# Patient Record
Sex: FEMALE | Race: WHITE | Employment: OTHER | URBAN - METROPOLITAN AREA
[De-identification: names, ages, dates, MRNs, and addresses within clinical notes are randomized per-mention and may not be internally consistent; named-entity substitution may affect disease eponyms.]

---

## 2017-12-15 ENCOUNTER — HOSPITAL ENCOUNTER (OUTPATIENT)
Dept: MAMMOGRAPHY | Age: 47
Discharge: HOME | End: 2017-12-15
Attending: OBSTETRICS & GYNECOLOGY
Payer: OTHER GOVERNMENT

## 2017-12-15 DIAGNOSIS — Z12.31 ENCOUNTER FOR SCREENING MAMMOGRAM FOR MALIGNANT NEOPLASM OF BREAST: ICD-10-CM

## 2017-12-15 PROCEDURE — 77067 SCR MAMMO BI INCL CAD: CPT

## 2019-04-24 ENCOUNTER — HOSPITAL ENCOUNTER (OUTPATIENT)
Dept: PHYSICAL THERAPY | Age: 49
Discharge: HOME | End: 2019-04-24
Payer: OTHER GOVERNMENT

## 2019-04-24 PROCEDURE — 97161 PT EVAL LOW COMPLEX 20 MIN: CPT

## 2019-04-24 NOTE — THERAPY EVALUATION
Christopher Ocasio  : 1970  Primary: [facility]  Secondary:  Therapy Center at [address]  [phone]   [phone]        OUTPATIENT PHYSICAL THERAPY:Initial Assessment 2019   ICD-10: Treatment Diagnosis: Pain in Joint, L Shoulder [M25.512]; Impingement Syndrome [M75.42]  Precautions/Allergies:   Patient has no known allergies. MD Orders: Evaluate and Treat MEDICAL/REFERRING DIAGNOSIS:  Pain in left shoulder [M25.512]   DATE OF ONSET: 2019  REFERRING PHYSICIAN: Troy Shah MD  RETURN PHYSICIAN APPOINTMENT: TBD     INITIAL ASSESSMENT:  Ms. Brice January presents with signs of L shoulder impingement syndrome. Pt. Demonstrates a painful arc and positive apple aston test today upon evaluation. Combined internal rotation, adduction, and extension during Apley testing is positive for pain and limited compared to the R side. Pt. Is tender to palpation of the L biceps musculature and long head of the biceps tendon. ROM limitations are present secondary to pain. Pt. Will benefit from a scapular and rotator cuff strengthening program to address shoulder pain and ROM limitations. PROBLEM LIST (Impacting functional limitations):  1. Decreased Strength  2. Decreased ADL/Functional Activities  3. Increased Pain  4. Decreased Activity Tolerance  5. Decreased Flexibility/Joint Mobility  6. Decreased Harvey with Home Exercise Program INTERVENTIONS PLANNED: (Treatment may consist of any combination of the following)  1. Home Exercise Program (HEP)  2. Manual Therapy  3. Range of Motion (ROM)  4. Therapeutic Activites  5. Therapeutic Exercise/Strengthening   TREATMENT PLAN:  Effective Dates: 2019 TO 2019 (90 days). Frequency/Duration: 2 times a week for 90 Day(s)  GOALS: (Goals have been discussed and agreed upon with patient.)  Discharge Goals: Time Frame: 8 weeks  1. Pt.  Will complete full strength training routing for one week with <2/10 L shoulder pain to improve recreational capacity. 2. Pt. Will sleep 8 hour without pain interruption from left shoulder to improve sleep. 3. Pt. Will demonstrate a full Apley combined movement of L shoulder IR, adduction, and extension to improve pain free ROM. 4. Pt. Will score <20% disability on the Quick DASH to demonstrate improved pain and function. OUTCOME MEASURE:   Tool Used: Disabilities of the Arm, Shoulder and Hand (DASH) Questionnaire - Quick Version  Score:  Initial: 25/55  Most Recent: X/55 (Date: -- )   Interpretation of Score: The DASH is designed to measure the activities of daily living in person's with upper extremity dysfunction or pain. Each section is scored on a 1-5 scale, 5 representing the greatest disability. The scores of each section are added together for a total score of 55. MEDICAL NECESSITY:   · Patient is expected to demonstrate progress in strength and range of motion to increase independence with ADL's and recreational capacity. .  REASON FOR SERVICES/OTHER COMMENTS:  · Patient will benefit from skilled PT to address impairments identified through evaluation and return to previous ADL and recreational capacity. Total Duration:  PT Patient Time In/Time Out  Time In: 1130  Time Out: 1230    Rehabilitation Potential For Stated Goals: Good       PAIN/SUBJECTIVE:   Initial: Pain Intensity 1: 8 /10 Post Session:  6/10   HISTORY:   History of Injury/Illness (Reason for Referral):  Pt. Attends PT c/o L shoulder pain with gradual insidious onset in March of 2019. Pt. Denies aggravating factor but notes she has been limited in usual strength training routine due to shoulder pain. Pain is aggravated with overhead activities, dressing, donning a bra, and reaching behind her.   The pain is located in the anterior/posterior shoulder with some dull ache in the front of the elbow and in the wrist.  Pt. Notes she had a cortisone injection that may have helped. Past Medical History/Comorbidities:   Ms. Dylan Quinteros  has a past medical history of Migraine. Ms. Dylan Quinteros  has a past surgical history that includes implant breast silicone/eq; hx tonsil and adenoidectomy (1995); and hx breast augmentation (2015). Social History/Living Environment:     Lives with spouse in two story home  Prior Level of Function/Work/Activity:  Functioned independently without limitations. Dominant Side:         LEFT   Ambulatory/Rehab Services H2 Model Falls Risk Assessment   Risk Factors:       No Risk Factors Identified Ability to Rise from Chair:       (0)  Ability to rise in a single movement   Falls Prevention Plan:       No modifications necessary   Total: (5 or greater = High Risk): 0   ©2010 [facility] [employer]. All Rights Reserved. [facility] Patent #[id number]. Federal Law prohibits the replication, distribution or use without written permission from [facility] Omnidrone   Current Medications:       Current Outpatient Medications:     cyclobenzaprine (FLEXERIL) 10 mg tablet, Take 1 tablet as needed once a day for tension headache, Disp: 30 Tab, Rfl: 0    norethindrone-e.estradiol-iron (LO LOESTRIN FE) 1 mg-10 mcg (24)/10 mcg (2) tab, Take 1 Tab by mouth daily. , Disp: 3 Package, Rfl: 3    SUMAtriptan (IMITREX) 100 mg tablet, TAKE 1 TABLET BY MOUTH ONCE AS NEEDED FOR MIGRAINE FOR UP TO 1 DOSE, Disp: 10 Tab, Rfl: 5    OTHER, Melaleuca Vitamin Sukhi, Disp: , Rfl:     pseudoephedrine (SUDAFED) 30 mg tablet, Take  by mouth every four (4) hours as needed for Congestion. , Disp: , Rfl:     multivitamin (ONE A DAY) tablet, Take 1 Tab by mouth daily. , Disp: , Rfl:     b complex vitamins tablet, Take 1 Tab by mouth daily. , Disp: , Rfl:     CHOLECALCIFEROL, VITAMIN D3, SL, by SubLINGual route., Disp: , Rfl:     prasterone, dhea, (DHEA) 25 mg cap, Take  by mouth., Disp: , Rfl:     Omega-3 Fatty Acids (FISH OIL) 300 mg cap, Take  by mouth., Disp: , Rfl:    Date Last Reviewed:  4/24/2019   Number of Personal Factors/Comorbidities that affect the Plan of Care: 0: LOW COMPLEXITY   EXAMINATION:    ROM:       RIGHT LEFT    Shoulder flexion  178  168    Shoulder abduction 175 170    Shoulder external rotation  100  45(pain), 95 overall    Shoulder internal rotation  50  45          Strength:       RIGHT  LEFT     Shoulder flexion  5/5 5/5     Shoulder ER  5/5  5/5    Shoulder IR 5/5 5/5    Scapular retraction  4/5  4/5          Flexibility:       RIGHT LEFT    Pectoralis Major/Minor  normal normal     Latissimus Dorsi  normal  normal    Upper trapezius  normal normal           Functional Mobility:       RIGHT LEFT    Apley IR  WNL To L5     Apley ER  WNL  WNL    Horizontal adduction        Active impingement               Special Test RIGHT LEFT    Cody Lawrence (-) (+)     Painful Arc (-)  (+)           Posture: rounded shoulders          Body Structures Involved:  1. Joints  2. Muscles Body Functions Affected:  1. Neuromusculoskeletal  2. Movement Related Activities and Participation Affected:  1. Mobility  2.  Self Care   Number of elements (examined above) that affect the Plan of Care: 1-2: LOW COMPLEXITY   CLINICAL PRESENTATION:   Presentation: Stable and uncomplicated: LOW COMPLEXITY   CLINICAL DECISION MAKING:   Use of outcome tool(s) and clinical judgement create a POC that gives a: Clear prediction of patient's progress: LOW COMPLEXITY

## 2019-04-30 ENCOUNTER — APPOINTMENT (OUTPATIENT)
Dept: PHYSICAL THERAPY | Age: 49
End: 2019-04-30
Payer: OTHER GOVERNMENT

## 2019-04-30 ENCOUNTER — HOSPITAL ENCOUNTER (OUTPATIENT)
Dept: PHYSICAL THERAPY | Age: 49
Discharge: HOME | End: 2019-04-30
Payer: OTHER GOVERNMENT

## 2019-04-30 PROCEDURE — 97140 MANUAL THERAPY 1/> REGIONS: CPT

## 2019-04-30 PROCEDURE — 97110 THERAPEUTIC EXERCISES: CPT

## 2019-04-30 NOTE — PROGRESS NOTES
eFlix Meza  : 1970  [facility]  Secondary:  [address] at [facility], [address] [facility]  [phone]   [phone]      OUTPATIENT PHYSICAL THERAPY: Daily Treatment Note 2019  Pre-treatment Symptoms/Complaints:  Pt. Reports good compliance with HEP. Pain: Initial: Pain Intensity 1: 7 /10 Post Session:  7/10   Medications Last Reviewed:  2019  Updated Objective Findings:  None Today   TREATMENT:     Therapeutic Exercise: (45 Minutes):  Exercises per grid below to improve mobility and strength. Required minimal verbal and manual cues to promote proper body alignment and promote proper body posture. Progressed resistance, range and repetitions as indicated. Date:  2019   Activity/Exercise Parameters   PNF Rhythmic stabilization 4 minutes   Sidelying shoulder ER (1#) 3 x 15   Prone scapular retraction 3 x 20   Serratus punch (4#) 3 x 15   PNF D2 shoulder extension 3 x 10   Half kneeling shoulder press (2-4#) 3 x 10   Modified push up 3 x 10   Bird dog with reach 3 x 15   Kettle bell carry 4 minutes   Rows (green band) 3 x 15     Manual Therapy (    Soft Tissue Mobilization Duration  Duration: 10 Minutes): Manual techniques to facilitate improved motion and decreased pain. (Used abbreviations: MET - muscle energy technique; PNF - proprioceptive neuromuscular facilitation; NMR - neuromuscular re-education; a/p - anterior to posterior; p/a - posterior to anterior)   · Soft tissue mobilization: L subscapularis, biceps brachii, infraspinatus  · Joint mobilization: thoracic spine grade III in prone    MedBridge Portal  Treatment/Session Summary:    · Response to Treatment:  Pt. tolerates therapeutic exercises today with mild fatigue within session. Pt. requires multiple cues for appropriate scapular positioning during exercise today.    .  · Communication/Consultation:  None today  · Equipment provided today: None today  · Recommendations/Intent for next treatment session: Next visit will focus on scapular and rotator cuff strengthening.  .  Treatment Plan of Care Effective Dates:  4/24/19 to 7/23/19  Total Treatment Billable Duration:  55 minutes  PT Patient Time In/Time Out  Time In: 1030  Time Out: [id number] Bioniz Gams, PT    Future Appointments   Date Time Provider Romi Carter   5/1/2019  7:15 AM SFE [facility] BI ROOM 2 [facility]FE   5/1/2019 10:30 AM Alana Marly, PT [facility]   5/8/2019  9:30 AM Alana Marly, PT [facility]   5/14/2019  9:30 AM Alana Marly, PT [facility]   5/15/2019 10:30 AM Alana Marly, PT [facility]   5/21/2019 10:30 AM Alana Marly, PT [facility]   9/18/2019  9:45 AM Debbie Owen MD [facility]   11/8/2019  9:00 AM PST LAB [facility] PST PST   11/15/2019  9:00 AM Ariadne Ureña MD [facility] PST PST

## 2019-05-01 ENCOUNTER — HOSPITAL ENCOUNTER (OUTPATIENT)
Dept: PHYSICAL THERAPY | Age: 49
Discharge: HOME | End: 2019-05-01
Payer: OTHER GOVERNMENT

## 2019-05-01 ENCOUNTER — HOSPITAL ENCOUNTER (OUTPATIENT)
Dept: MAMMOGRAPHY | Age: 49
Discharge: HOME | End: 2019-05-01
Attending: OBSTETRICS & GYNECOLOGY
Payer: OTHER GOVERNMENT

## 2019-05-01 ENCOUNTER — APPOINTMENT (OUTPATIENT)
Dept: PHYSICAL THERAPY | Age: 49
End: 2019-05-01
Payer: OTHER GOVERNMENT

## 2019-05-01 DIAGNOSIS — Z12.39 SCREENING FOR BREAST CANCER: ICD-10-CM

## 2019-05-01 PROCEDURE — 77067 SCR MAMMO BI INCL CAD: CPT

## 2019-05-01 PROCEDURE — 97110 THERAPEUTIC EXERCISES: CPT

## 2019-05-01 PROCEDURE — 97140 MANUAL THERAPY 1/> REGIONS: CPT

## 2019-05-01 NOTE — PROGRESS NOTES
Yasmin Pao  : 1970  Primary: [facility]  Secondary:  Therapy Center at [address], [address] [place] [address]  [phone]   [phone]      OUTPATIENT PHYSICAL THERAPY: Daily Treatment Note 2019  Pre-treatment Symptoms/Complaints:  Pt. Notes mild increase in pain over last two days. Pt. Notes the pain is only present with certain movements like reaching behind her. Pain: Initial: Pain Intensity 1: 10 Post Session:  7/10   Medications Last Reviewed:  2019  Updated Objective Findings:  Thoracic Spine hypomobile, normal ROM with lumbar lock testing in thoracic spine. TREATMENT:     Therapeutic Exercise: (45 Minutes):  Exercises per grid below to improve mobility and strength. Required minimal verbal and manual cues to promote proper body alignment and promote proper body posture. Progressed resistance, range and repetitions as indicated. Date:  2019   Activity/Exercise Parameters   PNF Rhythmic stabilization 4 minutes   Sidelying shoulder ER (1-2#) 3 x 15   Prone scapular retraction --   Serratus punch (4#) 3 x 15   PNF D2 shoulder extension --   Half kneeling shoulder press (2-4#) --   Modified push up --   Bird dog with reach 3 x 15   Kettle bell carry --   Rows (red band) 3 x 15   Rolling patterns 10 minutes   Single arm row (8#) 3 x 10   Modified plank 3 minutes   I's an T's 3 x 20     Manual Therapy (    Soft Tissue Mobilization Duration  Duration: 10 Minutes): Manual techniques to facilitate improved motion and decreased pain.  (Used abbreviations: MET - muscle energy technique; PNF - proprioceptive neuromuscular facilitation; NMR - neuromuscular re-education; a/p - anterior to posterior; p/a - posterior to anterior)   · Soft tissue mobilization: L subscapularis, biceps brachii, infraspinatus  · Joint mobilization: thoracic spine grade III in prone, Grade V in supine a/p    MedBridge Portal  Treatment/Session Summary:    · Response to Treatment:  End range external rotation at 90 degrees of abduction and combine IR,/Adduction/extesnion in the L arm reproduce pain today. Pt. demonstrates appropriate motor control with rolling patterns today after practice. Pt. will benefit from continue motor control and strengthening exercises to address pain. .  · Communication/Consultation:  None today  · Equipment provided today:  None today  · Recommendations/Intent for next treatment session: Next visit will focus on scapular and rotator cuff strengthening.  .  Treatment Plan of Care Effective Dates:  4/24/19 to 7/23/19  Total Treatment Billable Duration:  55 minutes  PT Patient Time In/Time Out  Time In: 1030  Time Out: [id number] [employer] [facility] Gams, PT    Future Appointments   Date Time Provider Romi Carter   5/8/2019  9:30 AM Gaviota Montes, PT [facility]   5/14/2019  9:30 AM Gaviota Montes, PT [facility]   5/15/2019 10:30 AM Gaviota Montes, PT [facility]   5/21/2019 10:30 AM Gaviota Montes, PT [facility]   9/18/2019  9:45 AM Chrissie Oliva MD [facility]   11/8/2019  9:00 AM PST LAB [facility] PST PST   11/15/2019  9:00 AM Danay Gutierrez MD [facility] PST PST

## 2019-05-06 ENCOUNTER — APPOINTMENT (OUTPATIENT)
Dept: PHYSICAL THERAPY | Age: 49
End: 2019-05-06
Payer: OTHER GOVERNMENT

## 2019-05-08 ENCOUNTER — APPOINTMENT (OUTPATIENT)
Dept: PHYSICAL THERAPY | Age: 49
End: 2019-05-08
Payer: OTHER GOVERNMENT

## 2019-05-08 ENCOUNTER — HOSPITAL ENCOUNTER (OUTPATIENT)
Dept: PHYSICAL THERAPY | Age: 49
Discharge: HOME | End: 2019-05-08
Payer: OTHER GOVERNMENT

## 2019-05-08 PROCEDURE — 97110 THERAPEUTIC EXERCISES: CPT

## 2019-05-08 PROCEDURE — 97140 MANUAL THERAPY 1/> REGIONS: CPT

## 2019-05-08 NOTE — PROGRESS NOTES
Fercho Sanchez  : 1970  Primary: [facility]  Secondary:  Therapy Center at [address]  [phone]   [phone]      OUTPATIENT PHYSICAL THERAPY: Daily Treatment Note 2019  Pre-treatment Symptoms/Complaints:  Pt. Notes improved L shoulder pain after resting a few days. Pt. Notes mild ache with reaching activities. Pain: Initial: Pain Intensity 1: 6 /10 Post Session:  6/10   Medications Last Reviewed:  2019  Updated Objective Findings:  None Today   TREATMENT:     Therapeutic Exercise: (40 Minutes):  Exercises per grid below to improve mobility and strength. Required minimal verbal and manual cues to promote proper body alignment and promote proper body posture. Progressed resistance, range and repetitions as indicated. Date:  2019   Activity/Exercise Parameters   PNF Rhythmic stabilization 4 minutes   Sidelying shoulder ER (1-2#) 3 x 15   Prone scapular retraction --   Serratus punch (4#) 3 x 15   PNF D2 shoulder extension 3 x 10   Half kneeling shoulder press (2-4#) --   Modified push up --   Bird dog with reach 3 x 15   CallmyName bell carry --   Rows (red band) 3 x 15   Rolling patterns --   Single arm row (10#) 3 x 10   Modified plank --   I's an T's (1#) 3 x 20   crawling 4 minutes     Manual Therapy (    Soft Tissue Mobilization Duration  Duration: 15 Minutes): Manual techniques to facilitate improved motion and decreased pain. (Used abbreviations: MET - muscle energy technique; PNF - proprioceptive neuromuscular facilitation; NMR - neuromuscular re-education; a/p - anterior to posterior; p/a - posterior to anterior)   · Soft tissue mobilization: L subscapularis, biceps brachii, infraspinatus  · Joint mobilization: thoracic spine grade III in prone, Grade V in supine a/p  · Dry needling: L subscapularis, near long head of biceps tendon, infraspinatus.     [facility] Portal  Treatment/Session Summary:    · Response to Treatment:  Pt. continues to demonstrate mild pain with end range ER. Pt. will benefit from continued rotator cuff and scapular strengthening to address continued pain. .  · Communication/Consultation:  None today  · Equipment provided today:  None today  · Recommendations/Intent for next treatment session: Next visit will focus on scapular and rotator cuff strengthening.  .  Treatment Plan of Care Effective Dates:  4/24/19 to 7/23/19  Total Treatment Billable Duration:  55 minutes  PT Patient Time In/Time Out  Time In: 0930  Time Out: 289 [facility],     Future Appointments   Date Time Provider Romi Carter   5/14/2019  9:30 AM Mariel Sinclair, PT [facility]   5/15/2019 10:30 AM Mariel Sinclair, PT [facility]   5/21/2019 10:30 AM Mariel Sinclair, PT [facility]   9/18/2019  9:45 AM Kandice James MD [facility]   11/8/2019  9:00 AM PST LAB [facility]   11/15/2019  9:00 AM Mario Hussein MD [facility] PST

## 2019-05-13 ENCOUNTER — APPOINTMENT (OUTPATIENT)
Dept: PHYSICAL THERAPY | Age: 49
End: 2019-05-13
Payer: OTHER GOVERNMENT

## 2019-05-14 ENCOUNTER — APPOINTMENT (OUTPATIENT)
Dept: PHYSICAL THERAPY | Age: 49
End: 2019-05-14
Payer: OTHER GOVERNMENT

## 2019-05-15 ENCOUNTER — HOSPITAL ENCOUNTER (OUTPATIENT)
Dept: PHYSICAL THERAPY | Age: 49
Discharge: HOME | End: 2019-05-15
Payer: OTHER GOVERNMENT

## 2019-05-15 ENCOUNTER — APPOINTMENT (OUTPATIENT)
Dept: PHYSICAL THERAPY | Age: 49
End: 2019-05-15
Payer: OTHER GOVERNMENT

## 2019-05-15 PROCEDURE — 97110 THERAPEUTIC EXERCISES: CPT

## 2019-05-15 PROCEDURE — 97140 MANUAL THERAPY 1/> REGIONS: CPT

## 2019-05-15 NOTE — PROGRESS NOTES
Stephanie Atkinson  : 1970  Primary: [facility]  Secondary:  Therapy Center at [facility], [address] [facility]  [phone]   [phone]      OUTPATIENT PHYSICAL THERAPY: Daily Treatment Note 5/15/2019   ICD-10: Treatment Diagnosis: Pain in Joint, L Shoulder [M25.512]; Impingement Syndrome [M75.42]  Precautions/Allergies:   Patient has no known allergies. MD Orders: Evaluate and Treat MEDICAL/REFERRING DIAGNOSIS:  Pain in left shoulder [M25.512]   DATE OF ONSET: 2019  REFERRING PHYSICIAN: Jessica Wilkinson MD  RETURN PHYSICIAN APPOINTMENT: TBD         Pre-treatment Symptoms/Complaints:  Pt. Reports improved L shoulder pain without major flare up since last week. Pain: Initial: Pain Intensity 1: 10 Post Session:  4/10   Medications Last Reviewed:  5/15/2019  Updated Objective Findings:  Pain reproduced at end range shoulder ER at 90 degrees of shoulder abduction   TREATMENT:     Therapeutic Exercise: (45 Minutes):  Exercises per grid below to improve mobility and strength. Required minimal verbal and manual cues to promote proper body alignment and promote proper body posture. Progressed resistance, range and repetitions as indicated. Date:  5/15/2019   Activity/Exercise Parameters   PNF Rhythmic stabilization 4 minutes   Sidelying shoulder ER (1-2#) 3 x 15   Prone scapular retraction --   Serratus punch (4#) 3 x 15   PNF D2 shoulder extension 3 x 10   Half kneeling shoulder press (4-5#) 3 x 10   Modified push up 3 x 5   Bird dog with reach --   Kettle bell carry --   Rows (red band) 3 x 15   Rolling patterns --   Single arm row (10#) 3 x 10   Modified plank 4 minutes   I's an T's (2#) 3 x 20   crawling 4 minutes     Manual Therapy (    Soft Tissue Mobilization Duration  Duration: 10 Minutes): Manual techniques to facilitate improved motion and decreased pain.  (Used abbreviations: MET - muscle energy technique; PNF - proprioceptive neuromuscular facilitation; NMR - neuromuscular re-education; a/p - anterior to posterior; p/a - posterior to anterior)   · Soft tissue mobilization: L subscapularis, biceps brachii, infraspinatus  · Joint mobilization: thoracic spine grade III in prone, Grade V in supine a/p  · Dry needling: L subscapularis, near long head of biceps tendon, infraspinatus. (NOT PERFORMED)    [facility] Portal  Treatment/Session Summary:    · Response to Treatment:  Pt. continues to require cueing for appropriate scapular and core control during exercises today. Pt. demonstrates improved endurance with strengthening exercises. .  · Communication/Consultation:  None today  · Equipment provided today:  None today  · Recommendations/Intent for next treatment session: Next visit will focus on scapular and rotator cuff strengthening.  .  Treatment Plan of Care Effective Dates:  4/24/19 to 7/23/19  Total Treatment Billable Duration:  55 minutes  PT Patient Time In/Time Out  Time In: 1030  Time Out: Romi Cotton, JAZMINE    Future Appointments   Date Time Provider Romi Carter   5/21/2019 10:30 AM Iban Gutierres PT [facility]   5/23/2019 11:30 AM Iban Gutierres PT [facility]   9/18/2019  9:45 AM Jocelynn Shen MD [facility]   11/8/2019  9:00 AM PST LAB [facility]   11/15/2019  9:00 AM Beatriz Roger MD [facility] PST

## 2019-05-20 ENCOUNTER — APPOINTMENT (OUTPATIENT)
Dept: PHYSICAL THERAPY | Age: 49
End: 2019-05-20
Payer: OTHER GOVERNMENT

## 2019-05-21 ENCOUNTER — HOSPITAL ENCOUNTER (OUTPATIENT)
Dept: PHYSICAL THERAPY | Age: 49
Discharge: HOME | End: 2019-05-21
Payer: OTHER GOVERNMENT

## 2019-05-21 PROCEDURE — 97140 MANUAL THERAPY 1/> REGIONS: CPT

## 2019-05-21 PROCEDURE — 97110 THERAPEUTIC EXERCISES: CPT

## 2019-05-21 NOTE — PROGRESS NOTES
Lennox Duck  : 1970  Primary: [facility]  Secondary:  Therapy Center at [facility], [address]  [phone]   [phone]      OUTPATIENT PHYSICAL THERAPY: Daily Treatment Note 2019   ICD-10: Treatment Diagnosis: Pain in Joint, L Shoulder [M25.512]; Impingement Syndrome [M75.42]  Precautions/Allergies:   Patient has no known allergies. MD Orders: Evaluate and Treat MEDICAL/REFERRING DIAGNOSIS:  Pain in left shoulder [M25.512]   DATE OF ONSET: 2019  REFERRING PHYSICIAN: Kieran Ochoa MD  RETURN PHYSICIAN APPOINTMENT: TBD         Pre-treatment Symptoms/Complaints:  Pt. Notes minimal pain and good compliance with HEP. Pain: Initial: Pain Intensity 1: 3 /10 Post Session:  3/10   Medications Last Reviewed:  2019  Updated Objective Findings:  None Today   TREATMENT:     Therapeutic Exercise: (45 Minutes):  Exercises per grid below to improve mobility and strength. Required minimal verbal and manual cues to promote proper body alignment and promote proper body posture. Progressed resistance, range and repetitions as indicated. Date:  2019   Activity/Exercise Parameters   PNF Rhythmic stabilization 4 minutes   Sidelying shoulder ER (1-2#) 3 x 15   Prone scapular retraction --   Serratus punch (4#) --   PNF D2 shoulder extension (10#) 3 x 10   Half kneeling shoulder press (5-10#) 3 x 10   Modified push up 3 x 5   Bird dog with resistance band 3 x 20   Kettle bell carry 4 minutes   Rows (red band) --   Rolling patterns --   Single arm row (10#) 3 x 10   Modified plank 4 minutes   I's an T's (2#) --   crawling 4 minutes     Manual Therapy (    Soft Tissue Mobilization Duration  Duration: 10 Minutes): Manual techniques to facilitate improved motion and decreased pain.  (Used abbreviations: MET - muscle energy technique; PNF - proprioceptive neuromuscular facilitation; NMR - neuromuscular re-education; a/p - anterior to posterior; p/a - posterior to anterior)   · Soft tissue mobilization: L subscapularis, biceps brachii, infraspinatus  · Joint mobilization: thoracic spine grade III in prone, Grade V in supine a/p  · Dry needling: L subscapularis, near long head of biceps tendon, infraspinatus. (NOT PERFORMED)    MedBridge Portal  Treatment/Session Summary:    · Response to Treatment:  Pt. demonstrates improved performance with scapular and rotator cuff strengthening. Mild pain remains at end range ER at 90 degrees of abdcution. Pt. will work on HEP and f/u with PT in two weeks. .  · Communication/Consultation:  None today  · Equipment provided today:  None today  · Recommendations/Intent for next treatment session: Next visit will focus on scapular and rotator cuff strengthening. PT will consider d/c with HEP.    Treatment Plan of Care Effective Dates:  4/24/19 to 7/23/19  Total Treatment Billable Duration:  55 minutes  PT Patient Time In/Time Out  Time In: 1030  Time Out: Romi Cotton PT    Future Appointments   Date Time Provider Romi Carter   6/6/2019 10:30 AM Sourav Glass PT [facility]   9/18/2019  9:45 AM Hailee Cantu MD [facility]   11/8/2019  9:00 AM PST LAB [facility]   11/15/2019  9:00 AM Faviola Arambula MD [facility]

## 2019-05-23 ENCOUNTER — APPOINTMENT (OUTPATIENT)
Dept: PHYSICAL THERAPY | Age: 49
End: 2019-05-23
Payer: OTHER GOVERNMENT

## 2019-05-29 NOTE — PROGRESS NOTES
I am accessing Ms. Stark's chart as a part of our department's internal chart auditing process. I certify that Ms. Charlotte Zacarias is, or was, a patient in our department.   Thank you,  Burton Jimenez, PT  5/29/2019

## 2019-06-06 ENCOUNTER — HOSPITAL ENCOUNTER (OUTPATIENT)
Dept: PHYSICAL THERAPY | Age: 49
Discharge: HOME | End: 2019-06-06
Payer: OTHER GOVERNMENT

## 2019-06-06 PROCEDURE — 97110 THERAPEUTIC EXERCISES: CPT

## 2019-06-06 NOTE — PROGRESS NOTES
Benigno Dakins  : 1970  Primary: [facility]  Secondary:  [facility] at [facility], [address] [facility]  [phone]   [phone]      OUTPATIENT PHYSICAL THERAPY: Daily Treatment Note 2019   ICD-10: Treatment Diagnosis: Pain in Joint, L Shoulder [M25.512]; Impingement Syndrome [M75.42]  Precautions/Allergies:   Patient has no known allergies. MD Orders: Evaluate and Treat MEDICAL/REFERRING DIAGNOSIS:  Pain in left shoulder [M25.512]   DATE OF ONSET: 2019  REFERRING PHYSICIAN: Jessica Lujan MD  RETURN PHYSICIAN APPOINTMENT: TBD         Pre-treatment Symptoms/Complaints:  Pt. Reports mostly 0/10 L shoulder pain over the past three weeks with home program.  Pt. Did experience a flare up on Monday with weight training. The pain resolved in 2 days. Pt. Notes she is confident to discharge with HEP. Pain: Initial: Pain Intensity 1: 1 /10 Post Session:  1/10   Medications Last Reviewed:  2019  Updated Objective Findings:  See evaluation note from today   TREATMENT:     Therapeutic Exercise: (45 Minutes):  Exercises per grid below to improve mobility and strength. Required minimal verbal and manual cues to promote proper body alignment and promote proper body posture. Progressed resistance, range and repetitions as indicated.      Date:  2019   Activity/Exercise Parameters   PNF Rhythmic stabilization 4 minutes   Sidelying shoulder ER (1-2#) --   Prone scapular retraction --   Serratus punch (4#) --   PNF D2 shoulder extension (10#) 3 x 10   Half kneeling shoulder press (5-10#) 3 x 10   Modified push up 3 x 5   Bird dog with resistance band 3 x 20   Kettle bell carry 4 minutes   Rows (red band) --   Rolling patterns --   Single arm row (10#) 3 x 10   Modified plank 4 minutes   I's an T's (2#) 3 x 10   Side plank 2 minutes       Keepy  Treatment/Session Summary:    · Response to Treatment:  Pt. demonstrates appropriate psychomotor knowledge of HEP and will discharge at this time.  .  · Communication/Consultation:  None today  · Equipment provided today:  None today  Treatment Plan of Care Effective Dates:  4/24/19 to 7/23/19  Total Treatment Billable Duration:  45 minutes  PT Patient Time In/Time Out  Time In: 1030  Time Out: Romi Cotton PT    Future Appointments   Date Time Provider Romi Carter   9/18/2019  9:45 AM Becca Felton MD [facility]   11/8/2019  9:00 AM PST LAB [facility]   11/15/2019  9:00 AM Davis Bishop MD [facility] PST

## 2019-06-06 NOTE — THERAPY DISCHARGE
Gregory Salvador  : 1970  [facility]  Secondary:  [facility] at [address]  [phone]   [phone]        OUTPATIENT PHYSICAL THERAPY:Discharge Summary 2019   ICD-10: Treatment Diagnosis: Pain in Joint, L Shoulder [M25.512]; Impingement Syndrome [M75.42]  Precautions/Allergies:   Patient has no known allergies. MD Orders: Evaluate and Treat MEDICAL/REFERRING DIAGNOSIS:  Pain in left shoulder [M25.512]   DATE OF ONSET: 2019  REFERRING PHYSICIAN: Gibson Gallo MD  RETURN PHYSICIAN APPOINTMENT: TBD     INITIAL ASSESSMENT:  Ms. Maribell Lai presents with signs of L shoulder impingement syndrome. Pt. Demonstrates a painful arc and positive apple aston test today upon evaluation. Combined internal rotation, adduction, and extension during Apley testing is positive for pain and limited compared to the R side. Pt. Is tender to palpation of the L biceps musculature and long head of the biceps tendon. ROM limitations are present secondary to pain. Pt. Will benefit from a scapular and rotator cuff strengthening program to address shoulder pain and ROM limitations. Discharge Summary: Pt. Attends 7 physical therapy visits. Pt. Demonstrates significant improvements in pain and function as assessed with the Quick DASH outcome measure. Pain free L shoulder ROM improves since initial evaluation and patient is independent with HEP to address scapular and rotator cuff strength. Pt will be discharged at this time. PROBLEM LIST (Impacting functional limitations):  1. Decreased Strength  2. Decreased ADL/Functional Activities  3. Increased Pain  4. Decreased Activity Tolerance  5. Decreased Flexibility/Joint Mobility  6. Decreased Harmon with Home Exercise Program INTERVENTIONS PLANNED: (Treatment may consist of any combination of the following)  1. Home Exercise Program (HEP)  2.  Manual Therapy  3. Range of Motion (ROM)  4. Therapeutic Activites  5. Therapeutic Exercise/Strengthening   TREATMENT PLAN:  Effective Dates: 4/24/2019 TO 7/23/2019 (90 days). Frequency/Duration: 2 times a week for 90 Day(s)  GOALS: (Goals have been discussed and agreed upon with patient.)  Discharge Goals: Time Frame: 8 weeks  1. Pt. Will complete full strength training routing for one week with <2/10 L shoulder pain to improve recreational capacity. NOT MET  2. Pt. Will sleep 8 hour without pain interruption from left shoulder to improve sleep. MET  3. Pt. Will demonstrate a full Apley combined movement of L shoulder IR, adduction, and extension to improve pain free ROM. MET  4. Pt. Will score <20% disability on the Quick DASH to demonstrate improved pain and function. MET    OUTCOME MEASURE:   Tool Used: Disabilities of the Arm, Shoulder and Hand (DASH) Questionnaire - Quick Version  Score:  Initial: 25/55  Most Recent: 12/55 (Date: 6/6/19 )   Interpretation of Score: The DASH is designed to measure the activities of daily living in person's with upper extremity dysfunction or pain. Each section is scored on a 1-5 scale, 5 representing the greatest disability. The scores of each section are added together for a total score of 55.        Total Duration:  PT Patient Time In/Time Out  Time In: 1030  Time Out: 1130    Rehabilitation Potential For Stated Goals: Aziza Kimble PT, DPT

## 2019-09-30 RX ORDER — SODIUM CHLORIDE 0.9 % (FLUSH) 0.9 %
5-40 SYRINGE (ML) INJECTION AS NEEDED
Status: CANCELLED | OUTPATIENT
Start: 2019-09-30

## 2019-09-30 RX ORDER — SODIUM CHLORIDE 0.9 % (FLUSH) 0.9 %
5-40 SYRINGE (ML) INJECTION EVERY 8 HOURS
Status: CANCELLED | OUTPATIENT
Start: 2019-09-30

## 2019-10-04 ENCOUNTER — ANESTHESIA EVENT (OUTPATIENT)
Dept: SURGERY | Age: 49
End: 2019-10-04
Payer: OTHER GOVERNMENT

## 2019-10-04 RX ORDER — ACETAMINOPHEN 500 MG
500 TABLET ORAL ONCE
Status: CANCELLED | OUTPATIENT
Start: 2019-10-04 | End: 2019-10-04

## 2019-10-04 RX ORDER — ONDANSETRON 2 MG/ML
4 INJECTION INTRAMUSCULAR; INTRAVENOUS ONCE
Status: CANCELLED | OUTPATIENT
Start: 2019-10-04 | End: 2019-10-04

## 2019-10-04 RX ORDER — OXYCODONE HYDROCHLORIDE 5 MG/1
10 TABLET ORAL
Status: CANCELLED | OUTPATIENT
Start: 2019-10-04 | End: 2019-10-05

## 2019-10-04 RX ORDER — NALOXONE HYDROCHLORIDE 0.4 MG/ML
0.1 INJECTION, SOLUTION INTRAMUSCULAR; INTRAVENOUS; SUBCUTANEOUS AS NEEDED
Status: CANCELLED | OUTPATIENT
Start: 2019-10-04 | End: 2019-10-04

## 2019-10-04 RX ORDER — SODIUM CHLORIDE, SODIUM LACTATE, POTASSIUM CHLORIDE, CALCIUM CHLORIDE 600; 310; 30; 20 MG/100ML; MG/100ML; MG/100ML; MG/100ML
75 INJECTION, SOLUTION INTRAVENOUS CONTINUOUS
Status: CANCELLED | OUTPATIENT
Start: 2019-10-04

## 2019-10-04 RX ORDER — HYDROMORPHONE HYDROCHLORIDE 2 MG/ML
0.5 INJECTION, SOLUTION INTRAMUSCULAR; INTRAVENOUS; SUBCUTANEOUS
Status: CANCELLED | OUTPATIENT
Start: 2019-10-04

## 2019-10-04 RX ORDER — OXYCODONE HYDROCHLORIDE 5 MG/1
5 TABLET ORAL
Status: CANCELLED | OUTPATIENT
Start: 2019-10-04 | End: 2019-10-05

## 2019-10-04 RX ORDER — DIPHENHYDRAMINE HYDROCHLORIDE 50 MG/ML
12.5 INJECTION, SOLUTION INTRAMUSCULAR; INTRAVENOUS ONCE
Status: CANCELLED | OUTPATIENT
Start: 2019-10-04 | End: 2019-10-04

## 2019-10-07 ENCOUNTER — HOSPITAL ENCOUNTER (OUTPATIENT)
Age: 49
Setting detail: OUTPATIENT SURGERY
Discharge: HOME | End: 2019-10-07
Attending: ORTHOPAEDIC SURGERY | Admitting: ORTHOPAEDIC SURGERY
Payer: OTHER GOVERNMENT

## 2019-10-07 ENCOUNTER — ANESTHESIA (OUTPATIENT)
Dept: SURGERY | Age: 49
End: 2019-10-07
Payer: OTHER GOVERNMENT

## 2019-10-07 VITALS
HEART RATE: 85 BPM | WEIGHT: 155 LBS | SYSTOLIC BLOOD PRESSURE: 117 MMHG | OXYGEN SATURATION: 96 % | BODY MASS INDEX: 24.28 KG/M2 | TEMPERATURE: 98 F | RESPIRATION RATE: 16 BRPM | DIASTOLIC BLOOD PRESSURE: 71 MMHG

## 2019-10-07 PROCEDURE — 77030010509 HC AIRWY LMA MSK TELE -A: Performed by: ANESTHESIOLOGY

## 2019-10-07 PROCEDURE — 74011250636 HC RX REV CODE- 250/636: Performed by: ORTHOPAEDIC SURGERY

## 2019-10-07 PROCEDURE — 77030032490 HC SLV COMPR SCD KNE COVD -B: Performed by: ORTHOPAEDIC SURGERY

## 2019-10-07 PROCEDURE — 74011250636 HC RX REV CODE- 250/636: Performed by: ANESTHESIOLOGY

## 2019-10-07 PROCEDURE — 74011000250 HC RX REV CODE- 250

## 2019-10-07 PROCEDURE — 77030019605: Performed by: ORTHOPAEDIC SURGERY

## 2019-10-07 PROCEDURE — 77030027384 HC PRB ARTHSCP SERFAS STRY -C: Performed by: ORTHOPAEDIC SURGERY

## 2019-10-07 PROCEDURE — 76210000020 HC REC RM PH II FIRST 0.5 HR: Performed by: ORTHOPAEDIC SURGERY

## 2019-10-07 PROCEDURE — 77030003666 HC NDL SPINAL BD -A: Performed by: ORTHOPAEDIC SURGERY

## 2019-10-07 PROCEDURE — 76210000063 HC OR PH I REC FIRST 0.5 HR: Performed by: ORTHOPAEDIC SURGERY

## 2019-10-07 PROCEDURE — 77030002933 HC SUT MCRYL J&J -A: Performed by: ORTHOPAEDIC SURGERY

## 2019-10-07 PROCEDURE — 77030004453 HC BUR SHV STRY -B: Performed by: ORTHOPAEDIC SURGERY

## 2019-10-07 PROCEDURE — 77030006891 HC BLD SHV RESECT STRY -B: Performed by: ORTHOPAEDIC SURGERY

## 2019-10-07 PROCEDURE — 76060000033 HC ANESTHESIA 1 TO 1.5 HR: Performed by: ORTHOPAEDIC SURGERY

## 2019-10-07 PROCEDURE — 77030003602 HC NDL NRV BLK BBMI -B: Performed by: ANESTHESIOLOGY

## 2019-10-07 PROCEDURE — 77030018836 HC SOL IRR NACL ICUM -A: Performed by: ORTHOPAEDIC SURGERY

## 2019-10-07 PROCEDURE — A4565 SLINGS: HCPCS | Performed by: ORTHOPAEDIC SURGERY

## 2019-10-07 PROCEDURE — 76942 ECHO GUIDE FOR BIOPSY: CPT | Performed by: ORTHOPAEDIC SURGERY

## 2019-10-07 PROCEDURE — 77030016570 HC BLNKT BAIR HGGR 3M -B: Performed by: ANESTHESIOLOGY

## 2019-10-07 PROCEDURE — 77030040361 HC SLV COMPR DVT MDII -B: Performed by: ORTHOPAEDIC SURGERY

## 2019-10-07 PROCEDURE — 74011250636 HC RX REV CODE- 250/636

## 2019-10-07 PROCEDURE — 76010000161 HC OR TIME 1 TO 1.5 HR INTENSV-TIER 1: Performed by: ORTHOPAEDIC SURGERY

## 2019-10-07 PROCEDURE — 76010010054 HC POST OP PAIN BLOCK: Performed by: ORTHOPAEDIC SURGERY

## 2019-10-07 RX ORDER — LIDOCAINE HYDROCHLORIDE 20 MG/ML
INJECTION, SOLUTION EPIDURAL; INFILTRATION; INTRACAUDAL; PERINEURAL AS NEEDED
Status: DISCONTINUED | OUTPATIENT
Start: 2019-10-07 | End: 2019-10-07 | Stop reason: HOSPADM

## 2019-10-07 RX ORDER — EPINEPHRINE 1 MG/ML
INJECTION INTRAMUSCULAR; INTRAVENOUS; SUBCUTANEOUS AS NEEDED
Status: DISCONTINUED | OUTPATIENT
Start: 2019-10-07 | End: 2019-10-07 | Stop reason: HOSPADM

## 2019-10-07 RX ORDER — CEFAZOLIN SODIUM/WATER 2 G/20 ML
2 SYRINGE (ML) INTRAVENOUS ONCE
Status: COMPLETED | OUTPATIENT
Start: 2019-10-07 | End: 2019-10-07

## 2019-10-07 RX ORDER — SODIUM CHLORIDE, SODIUM LACTATE, POTASSIUM CHLORIDE, CALCIUM CHLORIDE 600; 310; 30; 20 MG/100ML; MG/100ML; MG/100ML; MG/100ML
1000 INJECTION, SOLUTION INTRAVENOUS CONTINUOUS
Status: DISCONTINUED | OUTPATIENT
Start: 2019-10-07 | End: 2019-10-07 | Stop reason: HOSPADM

## 2019-10-07 RX ORDER — LIDOCAINE HYDROCHLORIDE 10 MG/ML
0.1 INJECTION INFILTRATION; PERINEURAL AS NEEDED
Status: DISCONTINUED | OUTPATIENT
Start: 2019-10-07 | End: 2019-10-07 | Stop reason: HOSPADM

## 2019-10-07 RX ORDER — SODIUM CHLORIDE 0.9 % (FLUSH) 0.9 %
5-40 SYRINGE (ML) INJECTION EVERY 8 HOURS
Status: DISCONTINUED | OUTPATIENT
Start: 2019-10-07 | End: 2019-10-07 | Stop reason: HOSPADM

## 2019-10-07 RX ORDER — ROPIVACAINE HYDROCHLORIDE 5 MG/ML
INJECTION, SOLUTION EPIDURAL; INFILTRATION; PERINEURAL
Status: COMPLETED | OUTPATIENT
Start: 2019-10-07 | End: 2019-10-07

## 2019-10-07 RX ORDER — PROPOFOL 10 MG/ML
INJECTION, EMULSION INTRAVENOUS AS NEEDED
Status: DISCONTINUED | OUTPATIENT
Start: 2019-10-07 | End: 2019-10-07 | Stop reason: HOSPADM

## 2019-10-07 RX ORDER — SODIUM CHLORIDE 0.9 % (FLUSH) 0.9 %
5-40 SYRINGE (ML) INJECTION AS NEEDED
Status: DISCONTINUED | OUTPATIENT
Start: 2019-10-07 | End: 2019-10-07 | Stop reason: HOSPADM

## 2019-10-07 RX ORDER — MIDAZOLAM HYDROCHLORIDE 1 MG/ML
2 INJECTION, SOLUTION INTRAMUSCULAR; INTRAVENOUS
Status: COMPLETED | OUTPATIENT
Start: 2019-10-07 | End: 2019-10-07

## 2019-10-07 RX ORDER — ONDANSETRON 2 MG/ML
INJECTION INTRAMUSCULAR; INTRAVENOUS AS NEEDED
Status: DISCONTINUED | OUTPATIENT
Start: 2019-10-07 | End: 2019-10-07 | Stop reason: HOSPADM

## 2019-10-07 RX ORDER — DEXAMETHASONE SODIUM PHOSPHATE 4 MG/ML
INJECTION, SOLUTION INTRA-ARTICULAR; INTRALESIONAL; INTRAMUSCULAR; INTRAVENOUS; SOFT TISSUE AS NEEDED
Status: DISCONTINUED | OUTPATIENT
Start: 2019-10-07 | End: 2019-10-07 | Stop reason: HOSPADM

## 2019-10-07 RX ORDER — FENTANYL CITRATE 50 UG/ML
100 INJECTION, SOLUTION INTRAMUSCULAR; INTRAVENOUS AS NEEDED
Status: DISCONTINUED | OUTPATIENT
Start: 2019-10-07 | End: 2019-10-07 | Stop reason: HOSPADM

## 2019-10-07 RX ADMIN — FENTANYL CITRATE 100 MCG: 50 INJECTION INTRAMUSCULAR; INTRAVENOUS at 06:47

## 2019-10-07 RX ADMIN — ONDANSETRON 4 MG: 2 INJECTION INTRAMUSCULAR; INTRAVENOUS at 07:43

## 2019-10-07 RX ADMIN — PROPOFOL 200 MG: 10 INJECTION, EMULSION INTRAVENOUS at 07:19

## 2019-10-07 RX ADMIN — MIDAZOLAM 2 MG: 1 INJECTION INTRAMUSCULAR; INTRAVENOUS at 06:47

## 2019-10-07 RX ADMIN — ROPIVACAINE HYDROCHLORIDE 30 ML: 5 INJECTION, SOLUTION EPIDURAL; INFILTRATION; PERINEURAL at 06:51

## 2019-10-07 RX ADMIN — LIDOCAINE HYDROCHLORIDE 100 MG: 20 INJECTION, SOLUTION EPIDURAL; INFILTRATION; INTRACAUDAL; PERINEURAL at 07:19

## 2019-10-07 RX ADMIN — SODIUM CHLORIDE, SODIUM LACTATE, POTASSIUM CHLORIDE, AND CALCIUM CHLORIDE: 600; 310; 30; 20 INJECTION, SOLUTION INTRAVENOUS at 07:13

## 2019-10-07 RX ADMIN — Medication 2 G: at 07:30

## 2019-10-07 RX ADMIN — DEXAMETHASONE SODIUM PHOSPHATE 4 MG: 4 INJECTION, SOLUTION INTRA-ARTICULAR; INTRALESIONAL; INTRAMUSCULAR; INTRAVENOUS; SOFT TISSUE at 07:43

## 2019-10-07 RX ADMIN — SODIUM CHLORIDE, SODIUM LACTATE, POTASSIUM CHLORIDE, AND CALCIUM CHLORIDE 1000 ML: 600; 310; 30; 20 INJECTION, SOLUTION INTRAVENOUS at 06:53

## 2019-10-07 NOTE — OP NOTES
[address] [facility]  OPERATIVE REPORT    Name:  Tere Mosqueda  MR#:  [id number]  :  1970  ACCOUNT #:  [de-identified]  DATE OF SERVICE:  10/07/2019    PREOPERATIVE DIAGNOSES:  Impingement of the left shoulder with acromioclavicular joint arthritis and possible rotator cuff tear. POSTOPERATIVE DIAGNOSES:  1. Acromioclavicular joint arthritis of the left shoulder. 2.  Small bursal tear of the rotator cuff. 3.  Marked impingement of the left shoulder. PROCEDURE PERFORMED:  1. Arthroscopic resection of the distal clavicle. 62557  2. Debridement of small bursal tear of the rotator cuff - 87161.  3.  Subacromial decompression - . SURGEON:  Mariel Bond MD    ASSISTANT:  None. ANESTHESIA:  General.    COMPLICATIONS:  None. SPECIMENS REMOVED:  None. IMPLANTS:  None. ESTIMATED BLOOD LOSS:  Minimal.    PROCEDURE:  After an adequate level of general anesthesia was obtained, the patient's left shoulder was prepped and draped in usual sterile fashion. She had good motion of the shoulder, good stability. She had a block per Anesthesia for postop pain management and received antibiotics. The joint was distended posteriorly with saline. The arthroscope was introduced. The articular surface looked good. Undersurface of the rotator cuff looked fine. The biceps was intact and the anterior portal was made in the soft spot and probed and stable. There was no evidence of any instability. Arthroscope was placed in the subacromial space and a lateral portal was made. The patient had hooking of the acromion and a decompression was performed with the shaver and the wilfrido. She had osteophytes anteriorly and laterally. She had a thickened bursa. A bursectomy was performed. The rotator cuff was visualized and she had some bursal tearing of the rotator cuff but no full-thickness lesions or enough instability or loss of integrity to warrant a repair. This area was debrided with the shaver. She had degenerative changes noted on the end of the distal clavicle. Circumferentially, soft tissue was removed and then the inferior osteophytes removed from the lateral portal to 1 cm with a wilfrido in the undersurface and then to the more anterior portal and also the posterior portal with pressure applied superiorly. The superior bone was removed from both sides of the joint with resection of the distal clavicle. The wound was then copiously irrigated. The portals were closed with Steri-Strips. Sterile dressings were applied. The arm was placed in a sling. She tolerated the procedure well.       MD KAYDEN [email or web address]  D:  10/07/2019 8:11  T:  10/07/2019 8:32  JOB #:  [id number]  CC:  [facility]

## 2019-10-07 NOTE — ANESTHESIA PROCEDURE NOTES
Peripheral Block    Start time: 10/7/2019 6:47 AM  End time: 10/7/2019 6:50 AM  Performed by: Devonte Calvert MD  Authorized by: Devonte Calvert MD       Pre-procedure: Indications: at surgeon's request, post-op pain management and procedure for pain    Preanesthetic Checklist: patient identified, risks and benefits discussed, site marked, timeout performed, anesthesia consent given and patient being monitored    Timeout Time: 06:47          Block Type:   Block Type:   Interscalene  Laterality:  Left  Monitoring:  Standard ASA monitoring, responsive to questions, oxygen, continuous pulse ox, frequent vital sign checks and heart rate  Injection Technique:  Single shot  Procedures: ultrasound guided and nerve stimulator    Patient Position: seated  Prep: chlorhexidine    Location:  Interscalene  Needle Type:  Stimuplex  Needle Gauge:  22 G  Needle Localization:  Ultrasound guidance and nerve stimulator  Motor Response: minimal motor response >0.4 mA      Assessment:  Number of attempts:  1  Injection Assessment:  Incremental injection every 5 mL, no paresthesia, negative aspiration for CSF, local visualized surrounding nerve on ultrasound, negative aspiration for blood, no intravascular symptoms and ultrasound image on chart  Patient tolerance:  Patient tolerated the procedure well with no immediate complications

## 2019-10-07 NOTE — ANESTHESIA POSTPROCEDURE EVALUATION
Procedure(s):  LEFT SHOULDER - ARTHROSCOPY /DISTAL CLAVICLE RESECTION/ DECOMPRESSION . general, regional    Anesthesia Post Evaluation      Multimodal analgesia: multimodal analgesia used between 6 hours prior to anesthesia start to PACU discharge  Patient location during evaluation: bedside  Patient participation: complete - patient participated  Level of consciousness: responsive to verbal stimuli  Pain management: adequate  Airway patency: patent  Anesthetic complications: no  Cardiovascular status: hemodynamically stable  Respiratory status: spontaneous ventilation  Hydration status: stable        Vitals Value Taken Time   /68 10/7/2019  8:32 AM   Temp 36.8 °C (98.3 °F) 10/7/2019  8:25 AM   Pulse 77 10/7/2019  8:35 AM   Resp 16 10/7/2019  8:25 AM   SpO2 96 % 10/7/2019  8:35 AM   Vitals shown include unvalidated device data.

## 2019-10-07 NOTE — BRIEF OP NOTE
BRIEF OPERATIVE NOTE Date of Procedure: 10/7/2019 Preoperative Diagnosis: DJD of left AC (acromioclavicular) joint [M19.012] Incomplete rotator cuff tear or rupture of left shoulder, not specified as traumatic [M75.112] Postoperative Diagnosis: DJD of left AC (acromioclavicular) joint [M19.012] IMPINGEMENT Procedure(s): LEFT SHOULDER - ARTHROSCOPY /DISTAL CLAVICLE RESECTION/ DECOMPRESSION Surgeon(s) and Role: Gage Lee MD - Primary Surgical Assistant:  
 
 
Surgical Staff: 
Circ-1: Aram Thomas RN Scrub Tech-1: Neftali Reza Scrub Tech-2: Pepe Douglas Time In Time Out Incision Start 2853 Incision Close 0806 Anesthesia: General  
Estimated Blood Loss:  
 
Specimens: * No specimens in log * Findings:  
  
Complications:  
 
 
Implants: * No implants in log *

## 2019-10-07 NOTE — H&P
Outpatient Surgery History and Physical:  Kamran Smith was seen and examined. CHIEF COMPLAINT:     l knee . PE:     Visit Vitals  BP 99/62   Pulse 64   Temp 98.4 °F (36.9 °C)   Resp 18   Wt 70.3 kg (155 lb)   SpO2 98%   BMI 24.28 kg/m²       Heart:   Regular rhythm      Lungs:  Are clear      Past Medical History: There are no active problems to display for this patient. Surgical History:   Past Surgical History:   Procedure Laterality Date    HX ADENOIDECTOMY      HX BREAST AUGMENTATION  2015    HX TONSIL AND ADENOIDECTOMY  1995    HX TONSILLECTOMY      IMPLANT BREAST SILICONE/EQ         Social History: Patient  reports that she has never smoked. She has never used smokeless tobacco. She reports that she does not drink alcohol or use drugs. Family History:   Family History   Problem Relation Age of Onset    Breast Cancer Mother 58    Hypertension Mother     Depression Mother     Anxiety Mother     Alcohol abuse Father     Hypertension Father     Depression Father     Anxiety Father     Cancer Father         stage 4 lung cancer    Heart Disease Maternal Grandmother     Stroke Maternal Grandfather     Uterine Cancer Paternal Grandmother     Alcohol abuse Paternal Grandfather     Heart Attack Paternal Grandfather     No Known Problems Brother     No Known Problems Brother     Ovarian Cancer Neg Hx     Colon Cancer Neg Hx        Allergies: Reviewed per EMR  No Known Allergies    Medications:    No current facility-administered medications on file prior to encounter. Current Outpatient Medications on File Prior to Encounter   Medication Sig    norethindrone-e.estradiol-iron (LO LOESTRIN FE) 1 mg-10 mcg (24)/10 mcg (2) tab Take 1 Tab by mouth daily.     cyclobenzaprine (FLEXERIL) 10 mg tablet Take 1 tablet as needed once a day for tension headache    SUMAtriptan (IMITREX) 100 mg tablet TAKE 1 TABLET BY MOUTH ONCE AS NEEDED FOR MIGRAINE FOR UP TO 1 DOSE    OTHER Melaleuca Vitamin Sukhi    pseudoephedrine (SUDAFED) 30 mg tablet Take  by mouth every four (4) hours as needed for Congestion.  multivitamin (ONE A DAY) tablet Take 1 Tab by mouth daily.  b complex vitamins tablet Take 1 Tab by mouth daily.  CHOLECALCIFEROL, VITAMIN D3, SL by SubLINGual route.  prasterone, dhea, (DHEA) 25 mg cap Take  by mouth.  Omega-3 Fatty Acids (FISH OIL) 300 mg cap Take  by mouth. The surgery is planned for the  Knee . History and physical has been reviewed. The patient has been examined. There have been no significant clinical changes since the completion of the originally dated History and Physical.  Patient identified by surgeon; surgical site was confirmed by patient and surgeon. The patient is here today for outpatient surgery. I have examined the patient, no changes are noted in the patient's medical status. Necessity for the procedure/care is still present and the history and physical above is current. See the office notes for the full long term history of the problem. Please see the recent office notes for the musculoskeletal examination.     Signed By: Tsering Nair MD     October 7, 2019 7:04 AM

## 2019-10-07 NOTE — ANESTHESIA PREPROCEDURE EVALUATION
Relevant Problems   No relevant active problems       Anesthetic History   No history of anesthetic complications            Review of Systems / Medical History  Patient summary reviewed and pertinent labs reviewed    Pulmonary  Within defined limits                 Neuro/Psych         Headaches     Cardiovascular  Within defined limits                Exercise tolerance: >4 METS     GI/Hepatic/Renal  Within defined limits              Endo/Other  Within defined limits           Other Findings              Physical Exam    Airway  Mallampati: II  TM Distance: 4 - 6 cm         Cardiovascular    Rhythm: regular  Rate: normal         Dental  No notable dental hx       Pulmonary  Breath sounds clear to auscultation               Abdominal  GI exam deferred       Other Findings            Anesthetic Plan    ASA: 1  Anesthesia type: general      Post-op pain plan if not by surgeon: peripheral nerve block single    Induction: Intravenous  Anesthetic plan and risks discussed with: Patient and Family

## 2019-10-07 NOTE — DISCHARGE INSTRUCTIONS
ACTIVITY  · As tolerated and as directed by your doctor. · Bathe or shower as directed by your doctor. DIET  · Clear liquids until no nausea or vomiting; then light diet for the first day. · Advance to regular diet on second day, unless your doctor orders otherwise. · If nausea and vomiting continues, call your doctor. PAIN  · Take pain medication as directed by your doctor. · Call your doctor if pain is NOT relieved by medication. · DO NOT take aspirin of blood thinners unless directed by your doctor. DRESSING CARE       CALL YOUR DOCTOR IF   · Excessive bleeding that does not stop after holding pressure over the area  · Temperature of 101 degrees F or above  · Excessive redness, swelling or bruising, and/ or green or yellow, smelly discharge from incision    AFTER ANESTHESIA   · For the first 24 hours: DO NOT Drive, Drink alcoholic beverages, or Make important decisions. · Be aware of dizziness following anesthesia and while taking pain medication. APPOINTMENT DATE/ TIME    YOUR DOCTOR'S PHONE NUMBER       DISCHARGE SUMMARY from Nurse    PATIENT INSTRUCTIONS:    After general anesthesia or intravenous sedation, for 24 hours or while taking prescription Narcotics:  · Limit your activities  · Do not drive and operate hazardous machinery  · Do not make important personal or business decisions  · Do  not drink alcoholic beverages  · If you have not urinated within 8 hours after discharge, please contact your surgeon on call. *  Please give a list of your current medications to your Primary Care Provider. *  Please update this list whenever your medications are discontinued, doses are      changed, or new medications (including over-the-counter products) are added. *  Please carry medication information at all times in case of emergency situations.       These are general instructions for a healthy lifestyle:    No smoking/ No tobacco products/ Avoid exposure to second hand smoke    Surgeon General's Warning:  Quitting smoking now greatly reduces serious risk to your health. Obesity, smoking, and sedentary lifestyle greatly increases your risk for illness    A healthy diet, regular physical exercise & weight monitoring are important for maintaining a healthy lifestyle    You may be retaining fluid if you have a history of heart failure or if you experience any of the following symptoms:  Weight gain of 3 pounds or more overnight or 5 pounds in a week, increased swelling in our hands or feet or shortness of breath while lying flat in bed. Please call your doctor as soon as you notice any of these symptoms; do not wait until your next office visit. Recognize signs and symptoms of STROKE:    F-face looks uneven    A-arms unable to move or move unevenly    S-speech slurred or non-existent    T-time-call 911 as soon as signs and symptoms begin-DO NOT go       Back to bed or wait to see if you get better-TIME IS BRAIN. Post-Operative Instructions   For  Shoulder Arthroscopy  Phone:  [phone]    1. Apply ice to the shoulder as needed. 2. You may shower after the arthroscopy. Keep dressings in place for the first three days and do not get them wet. After three days, you may remove the dressings and leave the steri-strip bandages in place; they will peel off naturally. 3. You may discontinue use of your sling and begin active range of motion of the elbow as tolerated by pain, unless you are instructed otherwise. Do not lift arm by your side for 4 weeks. 4. Begin therapy as ordered. 5. Use any pain medication as instructed. You should take your pain medication as soon as you feel the anesthetic wearing off. Do not wait until you are in severe pain to begin taking your pain medication. 6. You may have some side effects from your pain medication. If you have nausea, try taking your medication with food.   For itching, you may take over the counter Benadryl. 7. You may have been given a prescription for Phenergan. This medication is used for nausea and vomiting. You do not need to get this prescription filled unless you have a problem. 8. If you have a problem, please call [address] [place] [address] at [phone], P.A. Post-Operative Instructions   For  Shoulder Arthroscopy  Phone:  [phone]    1. Apply ice to the shoulder as needed. 2. You may shower after the arthroscopy. Keep dressings in place for the first three days and do not get them wet. After three days, you may remove the dressings and leave the steri-strip bandages in place; they will peel off naturally. 3. You may discontinue use of your sling and begin active range of motion of the elbow as tolerated by pain, unless you are instructed otherwise. Do not lift arm by your side for 4 weeks. 4. Begin therapy as ordered. 5. Use any pain medication as instructed. You should take your pain medication as soon as you feel the anesthetic wearing off. Do not wait until you are in severe pain to begin taking your pain medication. 6. You may have some side effects from your pain medication. If you have nausea, try taking your medication with food. For itching, you may take over the counter Benadryl. 7. You may have been given a prescription for Phenergan. This medication is used for nausea and vomiting. You do not need to get this prescription filled unless you have a problem. 8. If you have a problem, please call [address] [place] [address] at [phone], P.A.

## 2019-11-04 ENCOUNTER — HOSPITAL ENCOUNTER (OUTPATIENT)
Dept: PHYSICAL THERAPY | Age: 49
Discharge: HOME | End: 2019-11-04
Payer: OTHER GOVERNMENT

## 2019-11-04 PROCEDURE — 97110 THERAPEUTIC EXERCISES: CPT

## 2019-11-04 PROCEDURE — 97161 PT EVAL LOW COMPLEX 20 MIN: CPT

## 2019-11-04 NOTE — PROGRESS NOTES
Lawyer Cartagena  : 1970  Primary: [facility]  Secondary:  [facility] [place] [address], [facility] [phone]   [phone]      OUTPATIENT PHYSICAL THERAPY: Daily Treatment Note 2019  Visit Count:  1    ICD-10: Treatment Diagnosis: Left shoulder pain (M25.512); Muscle weakness, generalized (M62.81)  Precautions/Allergies:   Patient has no known allergies. TREATMENT PLAN:  Effective Dates: 2019 TO 1/3/2020 (60 days). Frequency/Duration: 2 times a week for 60 Day(s)    Pre-treatment Symptoms/Complaints:  L shoulder pain  Pain: Initial: Pain Intensity 1: 7/10 Post Session:  5/10   Medications Last Reviewed:  2019  Updated Objective Findings:  See evaluation note from today  TREATMENT:     Therapeutic Exercise: (15 Minutes):  Exercises per grid below to improve mobility, strength and coordination. Required minimal visual, verbal and manual cues to promote proper body alignment and promote proper body mechanics. Progressed resistance, range, repetitions and complexity of movement as indicated. Date:  2019   Activity/Exercise Parameters   Pendulums x20   T-bar ER stretch 10x10 seconds   T-bar flexion stretch with golf club 10x10 seconds   Foam roll parallel 2 minutes                 Manual Therapy (    Soft Tissue Mobilization Duration  Duration: 5 Minutes): Manual techniques to facilitate improved motion and decreased pain. (Used abbreviations: MET - muscle energy technique; PNF - proprioceptive neuromuscular facilitation; NMR - neuromuscular re-education; a/p - anterior to posterior; p/a - posterior to anterior)   · Glenohumeral joint mobilizations - posterior/inferior direction    Treatment/Session Summary:    · Response to Treatment:  Josefina tolerated treatment well today.  She will benefit from skilled therapy to address soft tissue mobility of pecs, deltoid and upper trap as well as P/AROM. Lazarus Chaudhry · Communication/Consultation:  None today  · Equipment provided today:  HEP  · Recommendations/Intent for next treatment session: Next visit will focus on soft tissue mobility, P/AROM.     Total Treatment Billable Duration:  30 minute evaluation, 15 minutes therex  PT Patient Time In/Time Out  Time In: 1335  Time Out: 1430  Becky Jenkins    Future Appointments   Date Time Provider Romi Emma   11/7/2019 12:30 PM [facility]   11/8/2019  9:00 AM PST LAB [facility]   11/11/2019  9:30 AM [facility]   11/13/2019 12:30 PM [facility]ren[facility]   11/15/2019  9:00 AM Bj Dasilva MD [facility] PST PST   11/18/2019  9:30 AM [facility]   11/20/2019  9:30 AM [facility]   11/25/2019  9:30 AM Bay[facility]rennan [facility]   11/27/2019  2:30 PM Bayron Deabrennan SFOFF [facility]   5/4/2020  9:15 AM SFE [facility] ROOM 1 SFERMAM SFE   9/30/2020  8:45 AM Cora Pulido MD [facility]

## 2019-11-07 ENCOUNTER — HOSPITAL ENCOUNTER (OUTPATIENT)
Dept: PHYSICAL THERAPY | Age: 49
Discharge: HOME | End: 2019-11-07
Payer: OTHER GOVERNMENT

## 2019-11-07 PROCEDURE — 97140 MANUAL THERAPY 1/> REGIONS: CPT

## 2019-11-07 PROCEDURE — 97110 THERAPEUTIC EXERCISES: CPT

## 2019-11-07 NOTE — PROGRESS NOTES
Renetta Swanson  : 1970  Primary: [facility]  Secondary:  [facility] [place] [address], [address] [phone]   [phone]      OUTPATIENT PHYSICAL THERAPY: Daily Treatment Note 2019  Visit Count:  2    ICD-10: Treatment Diagnosis: Left shoulder pain (M25.512); Muscle weakness, generalized (M62.81)  Precautions/Allergies:   Patient has no known allergies. TREATMENT PLAN:  Effective Dates: 2019 TO 1/3/2020 (60 days). Frequency/Duration: 2 times a week for 60 Day(s)    Pre-treatment Symptoms/Complaints:  Still bothering her and feels stiff  Pain: Initial: Pain Intensity 1: 6/10 Post Session:  5/10   Medications Last Reviewed:  2019  Updated Objective Findings:  Passive ER 50 degrees, flexion 120 degrees with ball rolls  TREATMENT:     Therapeutic Exercise: (20 Minutes):  Exercises per grid below to improve mobility, strength and coordination. Required minimal visual, verbal and manual cues to promote proper body alignment and promote proper body mechanics. Progressed resistance, range, repetitions and complexity of movement as indicated. Date:  2019   Activity/Exercise Parameters   Pendulums - 3# x20-30   T-bar ER stretch 10x10 seconds   T-bar flexion stretch with golf club 10x10 seconds   Foam roll parallel --   Standing ball rolls for flexion x10   Seated scapular retraction x30   Isometrics - flex,ext,ER x15 each     Manual Therapy (    Soft Tissue Mobilization Duration  Duration: 30 Minutes): Manual techniques to facilitate improved motion and decreased pain.  (Used abbreviations: MET - muscle energy technique; PNF - proprioceptive neuromuscular facilitation; NMR - neuromuscular re-education; a/p - anterior to posterior; p/a - posterior to anterior)   · Glenohumeral joint mobilizations - posterior/inferior direction  · Shoulder PROM  · Scapular mobility and soft tissue mobilizations to upper trap and medial scapula    Modalities: ice pack to L shoulder at end of session - 10 minutes    Treatment/Session Summary:    · Response to Treatment:  Josefina tolerated treatment well today. After stretching she exhibits improved shoulder PROM. She is restricted in upper trap mobility due to guarding. She will benefit from continued work on soft tissue and joint mobility to improve PROM. · Communication/Consultation:  None today  · Equipment provided today:  None today  · Recommendations/Intent for next treatment session: Next visit will focus on soft tissue mobility, P/AROM.     Total Treatment Billable Duration:  20 minutes therex, 30 minutes manual  PT Patient Time In/Time Out  Time In: 1230  Time Out: 1330  Dulce Gauthier    Future Appointments   Date Time Provider Romi Emma   11/8/2019  9:00 AM PST LAB [facility]   11/11/2019  9:30 AM Yvrose Camera OFF [facility]   11/13/2019 12:30 PM [facility]vrose Camera [facility]   11/15/2019  9:00 AM Abhishek Ramos MD [facility] PST PST   11/18/2019  9:30 AM Yvrose Camera SFO[facility]   11/20/2019  9:30 AM [facility]   11/25/2019  9:30 AM Yvrose Camera [facility]   11/27/2019  2:30 PM Yvrose Camera SFO[facility]   5/4/2020  9:15 AM SFE [facility] ROOM 1 ERMAM E   9/30/2020  8:45 AM Oscar Rivera MD [address]

## 2019-11-11 ENCOUNTER — HOSPITAL ENCOUNTER (OUTPATIENT)
Dept: PHYSICAL THERAPY | Age: 49
Discharge: HOME | End: 2019-11-11
Payer: OTHER GOVERNMENT

## 2019-11-11 PROCEDURE — 97110 THERAPEUTIC EXERCISES: CPT

## 2019-11-11 PROCEDURE — 97140 MANUAL THERAPY 1/> REGIONS: CPT

## 2019-11-11 NOTE — PROGRESS NOTES
Lori Wayne  : 1970  Primary: [facility]  Secondary:  [address] at [facility]  [address], [address] [place] [address]  [phone]   [phone]      OUTPATIENT PHYSICAL THERAPY: Daily Treatment Note 2019  Visit Count:  3    ICD-10: Treatment Diagnosis: Left shoulder pain (M25.512); Muscle weakness, generalized (M62.81)  Precautions/Allergies:   Patient has no known allergies. TREATMENT PLAN:  Effective Dates: 2019 TO 1/3/2020 (60 days). Frequency/Duration: 2 times a week for 60 Day(s)    Pre-treatment Symptoms/Complaints:  Shoulder is starting to feel better, sleeping better at night. Pain: Initial: Pain Intensity 1: 4/10 Post Session:  5/10   Medications Last Reviewed:  2019  Updated Objective Findings:  Passive flexion 130 degrees  TREATMENT:     Therapeutic Exercise: (20 Minutes):  Exercises per grid below to improve mobility, strength and coordination. Required minimal visual, verbal and manual cues to promote proper body alignment and promote proper body mechanics. Progressed resistance, range, repetitions and complexity of movement as indicated. Date:  2019   Activity/Exercise Parameters   Pendulums - 3# x20-30   T-bar ER stretch 10x10 seconds   T-bar flexion stretch with golf club 10x10 seconds   Foam roll parallel 2 minutes   Seated towel slides for flexion x10-15   Seated scapular retraction x30   Isometrics - flex,ext,ER --     Manual Therapy (    Soft Tissue Mobilization Duration  Duration: 30 Minutes): Manual techniques to facilitate improved motion and decreased pain.  (Used abbreviations: MET - muscle energy technique; PNF - proprioceptive neuromuscular facilitation; NMR - neuromuscular re-education; a/p - anterior to posterior; p/a - posterior to anterior)   · Glenohumeral joint mobilizations - posterior/inferior direction  · Shoulder PROM  · Scapular mobility and soft tissue mobilizations to upper trap, medial scapula, lateral deltoid    Modalities: ice pack to L shoulder at end of session - 10 minutes    Treatment/Session Summary:    · Response to Treatment:  Stepan Echevarria is still restricted in glenohumeral joint mobility limiting passive ER ROM. She experiences significant pain when bringing arm back down from flexion with elbow extended. · Communication/Consultation:  None today  · Equipment provided today:  None today  · Recommendations/Intent for next treatment session: Next visit will focus on soft tissue mobility, P/AROM.     Total Treatment Billable Duration:  20 minutes therex, 30 minutes manual  PT Patient Time In/Time Out  Time In: 4927  Time Out: 1635  Miri Bacaf    Future Appointments   Date Time Provider Romi Emma   11/13/2019 12:30 PM Sharlie Finely [facility]   11/15/2019  9:00 AM Sonja Rodriguez MD [facility] PST PST   11/18/2019  9:30 AM Sharlie Finely [facility]   11/20/2019  9:30 AM Sharlie Finely [facility]   11/25/2019  9:30 AM Sharlie Finely [facility]   11/27/2019  2:30 PM Sharlie Finely [facility]   5/4/2020  9:15 AM SFE [facility] ROOM 1 SFERMAM SFE   9/30/2020  8:45 AM Jennifer Nassar MD [facility]

## 2019-11-13 ENCOUNTER — HOSPITAL ENCOUNTER (OUTPATIENT)
Dept: PHYSICAL THERAPY | Age: 49
Discharge: HOME | End: 2019-11-13
Payer: OTHER GOVERNMENT

## 2019-11-13 PROCEDURE — 97110 THERAPEUTIC EXERCISES: CPT

## 2019-11-13 PROCEDURE — 97140 MANUAL THERAPY 1/> REGIONS: CPT

## 2019-11-13 NOTE — PROGRESS NOTES
Adline Morning  : 1970  Primary: [facility]  Secondary:  [address] at [facility]  [address], [address] [place] [address]  [phone]   [phone]      OUTPATIENT PHYSICAL THERAPY: Daily Treatment Note 2019  Visit Count:  4    ICD-10: Treatment Diagnosis: Left shoulder pain (M25.512); Muscle weakness, generalized (M62.81)  Precautions/Allergies:   Patient has no known allergies. TREATMENT PLAN:  Effective Dates: 2019 TO 1/3/2020 (60 days). Frequency/Duration: 2 times a week for 60 Day(s)    Pre-treatment Symptoms/Complaints:  Shoulder is still sore but she is able to lie on her R side and have L arm laying on R without pillow in between. She also feels that she has more motion when stretching on the foam roll. Most pain is along biceps tendon  Pain: Initial: Pain Intensity 1: 10 Post Session:  5/10   Medications Last Reviewed:  2019  Updated Objective Findings:  Passive flexion 130 degrees  TREATMENT:     Therapeutic Exercise: (20 Minutes):  Exercises per grid below to improve mobility, strength and coordination. Required minimal visual, verbal and manual cues to promote proper body alignment and promote proper body mechanics. Progressed resistance, range, repetitions and complexity of movement as indicated. Date:  2019   Activity/Exercise Parameters   Pendulums - 3# x20-30   T-bar ER stretch 10x10 seconds   T-bar flexion stretch with golf club --   Foam roll parallel --   Seated towel slides for flexion x10-15   Seated scapular retraction x30   Isometrics - flex,ext,ER, IR x15-20 each - submax   KT tape to L shoulder/scapula 4 minutes         Manual Therapy (    Soft Tissue Mobilization Duration  Duration: 30 Minutes): Manual techniques to facilitate improved motion and decreased pain.  (Used abbreviations: MET - muscle energy technique; PNF - proprioceptive neuromuscular facilitation; NMR - neuromuscular re-education; a/p - anterior to posterior; p/a - posterior to anterior)   · Glenohumeral joint mobilizations - posterior/inferior direction  · Shoulder PROM  · Scapular mobility and soft tissue mobilizations to upper trap, medial scapula, lateral deltoid    Modalities: ice pack to L shoulder at end of session - 10 minutes    Treatment/Session Summary:    · Response to Treatment:  KT tape placed along L shoulder and scapula to help with cuing into scapular retraction and take some pressure off anterior shoulder. Advised her to decrease hold times on ER stretching and do more reps. Also advised her to do table slides for flexion stretching versus supine stretches. · Communication/Consultation:  None today  · Equipment provided today:  None today  · Recommendations/Intent for next treatment session: Next visit will focus on soft tissue mobility, P/AROM.     Total Treatment Billable Duration:  20 minutes therex, 30 minutes manual  PT Patient Time In/Time Out  Time In: 1230  Time Out: 1330  Mayelin Haney    Future Appointments   Date Time Provider Romi Carter   11/15/2019  9:00 AM Yamileth Mosquera MD [facility] PST PST   11/18/2019  9:30 AM Gregoria Fendt [facility]   11/20/2019  9:30 AM Gregoria Fendt [facility]   11/25/2019  9:30 AM Gregoria Fendt [facility]   11/27/2019  2:30 PM Gregoria Fendt [facility]   5/4/2020  9:15 AM SFE [facility] ROOM 1 SFERMAM SFE   9/30/2020  8:45 AM Kaylin Swanson MD [address]

## 2019-11-18 ENCOUNTER — HOSPITAL ENCOUNTER (OUTPATIENT)
Dept: PHYSICAL THERAPY | Age: 49
Discharge: HOME | End: 2019-11-18
Payer: OTHER GOVERNMENT

## 2019-11-18 PROCEDURE — 97110 THERAPEUTIC EXERCISES: CPT

## 2019-11-18 PROCEDURE — 97140 MANUAL THERAPY 1/> REGIONS: CPT

## 2019-11-18 NOTE — PROGRESS NOTES
Rose Marie Templeton  : 1970  Primary: [facility]  Secondary:  [address] at [place] [id number] [address], [address] [place] [address]  [phone]   [phone]      OUTPATIENT PHYSICAL THERAPY: Daily Treatment Note 2019  Visit Count:  5    ICD-10: Treatment Diagnosis: Left shoulder pain (M25.512); Muscle weakness, generalized (M62.81)  Precautions/Allergies:   Patient has no known allergies. TREATMENT PLAN:  Effective Dates: 2019 TO 1/3/2020 (60 days). Frequency/Duration: 2 times a week for 60 Day(s)    Pre-treatment Symptoms/Complaints:  Shoulder is still tight and pain into deltoid region. KT tape seemed to make shoulder feel more tight  Pain: Initial: Pain Intensity 1: 410 Post Session:  5/10   Medications Last Reviewed:  2019  Updated Objective Findings:  Upper trap substitution with active flexion secondary to glenohumeral joint immobility  TREATMENT:     Therapeutic Exercise: (20 Minutes):  Exercises per grid below to improve mobility, strength and coordination. Required minimal visual, verbal and manual cues to promote proper body alignment and promote proper body mechanics. Progressed resistance, range, repetitions and complexity of movement as indicated. Date:  2019   Activity/Exercise Parameters   Pendulums - 8.8# KB x20-30   T-bar ER stretch sitting 10x10 seconds   T-bar flexion stretch with golf club --   Foam roll parallel --   Seated towel slides for flexion x10-15   Scapular retraction - orange band 2x30   Isometric walk outs ER, IR - orange band x15-20 each - submax   KT tape to L shoulder/scapula --   Self mobilization with 8.8# - inferior glide 2x3 minutes     Manual Therapy (    Soft Tissue Mobilization Duration  Duration: 30 Minutes): Manual techniques to facilitate improved motion and decreased pain.  (Used abbreviations: MET - muscle energy technique; PNF - proprioceptive neuromuscular facilitation; NMR - neuromuscular re-education; a/p - anterior to posterior; p/a - posterior to anterior)   · Glenohumeral joint mobilizations - posterior/inferior direction  · Shoulder PROM  · Scapular mobility and soft tissue mobilizations to upper trap, medial scapula, lateral deltoid    Modalities: ice pack to L shoulder at end of session - 10 minutes    Treatment/Session Summary:    · Response to Treatment:  Stepan Echevarria continues to be restricted in glenohumeral joint mobility causing humeral elevation and limited flexion and ER ROM. She was educated in self inferior glides to do at home which felt really good to her. · Communication/Consultation:  None today  · Equipment provided today:  None today  · Recommendations/Intent for next treatment session: Next visit will focus on soft tissue mobility, P/AROM.     Total Treatment Billable Duration:  20 minutes therex, 30 minutes manual  PT Patient Time In/Time Out  Time In: 1174  Time Out: 1035  Miri Angela    Future Appointments   Date Time Provider Romi Carter   11/20/2019  2:30 PM [facility]   11/25/2019  9:30 AM Sharlie Finely [facility]   11/27/2019  2:30 PM Sharlie Finely [facility]   11/27/2019  4:00 PM Sonja Rodriguez MD [facility] PST PST   5/4/2020  9:15 AM PAUL [facility] ROOM 1 SFERMALISSA MILLER   9/30/2020  8:45 AM Jennifer Nassar MD [facility]

## 2019-11-20 ENCOUNTER — HOSPITAL ENCOUNTER (OUTPATIENT)
Dept: PHYSICAL THERAPY | Age: 49
Discharge: HOME | End: 2019-11-20
Payer: OTHER GOVERNMENT

## 2019-11-20 PROCEDURE — 97140 MANUAL THERAPY 1/> REGIONS: CPT

## 2019-11-20 PROCEDURE — 97110 THERAPEUTIC EXERCISES: CPT

## 2019-11-20 NOTE — PROGRESS NOTES
Renetta Arndtter  : 1970  Primary: [facility]  Secondary:  [address] at [place] [address] [address], [address] [place] [address]  [phone]   [phone]      OUTPATIENT PHYSICAL THERAPY: Daily Treatment Note 2019  Visit Count:  6    ICD-10: Treatment Diagnosis: Left shoulder pain (M25.512); Muscle weakness, generalized (M62.81)  Precautions/Allergies:   Patient has no known allergies. TREATMENT PLAN:  Effective Dates: 2019 TO 1/3/2020 (60 days). Frequency/Duration: 2 times a week for 60 Day(s)    Pre-treatment Symptoms/Complaints:  Doing okay today, better than last visit  Pain: Initial: Pain Intensity 1: 3/10 Post Session:  5/10   Medications Last Reviewed:  2019  Updated Objective Findings:  Passive flexion 135 degrees  TREATMENT:     Therapeutic Exercise: (20 Minutes):  Exercises per grid below to improve mobility, strength and coordination. Required minimal visual, verbal and manual cues to promote proper body alignment and promote proper body mechanics. Progressed resistance, range, repetitions and complexity of movement as indicated. Date:  2019   Activity/Exercise Parameters   Pendulums - 8.8# KB x20-30   T-bar ER stretch sitting 10x10 seconds   Flexion with ER assist from PT x10   Foam roll parallel --   Seated table slides for flexion x10-15   Scapular retraction - orange band 2x30   Isometric walk outs ER, IR - orange band x15-20 each - submax   Pulleys - flexion 5 minutes   Self mobilization with 8.8# - inferior glide 2x3 minutes     Manual Therapy (    Soft Tissue Mobilization Duration  Duration: 35 Minutes): Manual techniques to facilitate improved motion and decreased pain.  (Used abbreviations: MET - muscle energy technique; PNF - proprioceptive neuromuscular facilitation; NMR - neuromuscular re-education; a/p - anterior to posterior; p/a - posterior to anterior)   · Glenohumeral joint mobilizations - posterior/inferior direction  · Shoulder PROM  · Scapular mobility and soft tissue mobilizations to upper trap, medial scapula, lateral deltoid - IASTM    Modalities: ice pack to L shoulder at end of session - 10 minutes    Treatment/Session Summary:    · Response to Treatment:  Mechelle Campa was provided with pulleys to start using at home to help with flexion ROM. She exhibits improved PROM after IASTM to L posterior shoulder region. Added active flexion in supine with ER resistance to activate posterior rotator cuff. · Communication/Consultation:  None today  · Equipment provided today:  None today  · Recommendations/Intent for next treatment session: Next visit will focus on soft tissue mobility, P/AROM.     Total Treatment Billable Duration:  20 minutes therex, 35 minutes manual  PT Patient Time In/Time Out  Time In: 1430  Time Out: 0675  Octavia [facility] Appointments   Date Time Provider Romi Carter   11/25/2019  9:30 AM Kerry DEGROOTOFF [facility]   11/27/2019  2:30 PM Kerry Fitch SFOFF [facility]   11/27/2019  4:00 PM Antonio Cardona MD [facility] PST PST   5/4/2020  9:15 AM SFE [facility] ROOM 1 SFERMAM SFE   9/30/2020  8:45 AM Sis Simms MD [facility]

## 2019-11-25 ENCOUNTER — HOSPITAL ENCOUNTER (OUTPATIENT)
Dept: PHYSICAL THERAPY | Age: 49
Discharge: HOME | End: 2019-11-25
Payer: OTHER GOVERNMENT

## 2019-11-25 PROCEDURE — 97110 THERAPEUTIC EXERCISES: CPT

## 2019-11-25 PROCEDURE — 97140 MANUAL THERAPY 1/> REGIONS: CPT

## 2019-11-25 NOTE — PROGRESS NOTES
Emir Armas  : 1970  Primary: [facility]  Secondary:  [address] at [address], [address] [place] [address]  [phone]   [phone]      OUTPATIENT PHYSICAL THERAPY: Daily Treatment Note 2019  Visit Count:  7    ICD-10: Treatment Diagnosis: Left shoulder pain (M25.512); Muscle weakness, generalized (M62.81)  Precautions/Allergies:   Patient has no known allergies. TREATMENT PLAN:  Effective Dates: 2019 TO 1/3/2020 (60 days). Frequency/Duration: 2 times a week for 60 Day(s)    Pre-treatment Symptoms/Complaints: Mother has been in the hospital since last Thursday so it has been stressful the last few days. She carries her tension in her shoulders so L shoulder is tight today. Has been working on HEP daily  Pain: Initial: Pain Intensity 1: 4/10 Post Session:  10   Medications Last Reviewed:  2019  Updated Objective Findings:  None Today  TREATMENT:     Therapeutic Exercise: (20 Minutes):  Exercises per grid below to improve mobility, strength and coordination. Required minimal visual, verbal and manual cues to promote proper body alignment and promote proper body mechanics. Progressed resistance, range, repetitions and complexity of movement as indicated. Date:  2019   Activity/Exercise Parameters   Pendulums - 8.8# KB x20-30   T-bar ER stretch sitting 10x10 seconds   Flexion with ER assist from PT x10   Foam roll parallel --   R sidelying ball rolls into flexion with scapular assist from PT 2x10   Scapular retraction - orange band 2x30   Isometric walk outs ER, IR - orange band x15-20 each - submax   Pulleys - flexion 5 minutes   Self mobilization with 8.8# - inferior glide 2x3 minutes     Manual Therapy (    Soft Tissue Mobilization Duration  Duration: 35 Minutes): Manual techniques to facilitate improved motion and decreased pain.  (Used abbreviations: MET - muscle energy technique; PNF - proprioceptive neuromuscular facilitation; NMR - neuromuscular re-education; a/p - anterior to posterior; p/a - posterior to anterior)   · Glenohumeral joint mobilizations - posterior/inferior direction  · Shoulder PROM  · Scapular mobility and soft tissue mobilizations to upper trap, medial scapula, lateral deltoid - IASTM    Modalities: ice pack to L shoulder at end of session - 10 minutes    Treatment/Session Summary:    · Response to Treatment:  Cheri Camp was more restricted in soft tissue mobility of L upper traps and parascapular muscles. She continues to have poor posterior cuff activation causing glenohumeral elevation with flexion/abduction movements. Her most limiting factor is P/AROM at this point and she may benefit from an ERMI shoulder device to help restore functional ROM. · Communication/Consultation:  None today  · Equipment provided today:  None today  · Recommendations/Intent for next treatment session: Next visit will focus on soft tissue mobility, P/AROM.     Total Treatment Billable Duration:  20 minutes therex, 35 minutes manual  PT Patient Time In/Time Out  Time In: 0930  Time Out: 1035  Rosalina Garcia    Future Appointments   Date Time Provider Romi Carter   11/27/2019  2:30 PM Carlie Palafox [facility]   11/27/2019  4:00 PM Alexa Corea MD [facility] PST PST   5/4/2020  9:15 AM SFE [facility] ROOM 1 [facility]   9/30/2020  8:45 AM Prateek Corea MD [facility]

## 2019-11-27 ENCOUNTER — HOSPITAL ENCOUNTER (OUTPATIENT)
Dept: PHYSICAL THERAPY | Age: 49
Discharge: HOME | End: 2019-11-27
Payer: OTHER GOVERNMENT

## 2019-11-27 NOTE — PROGRESS NOTES
Kamran Smith  : 1970  Primary: [facility]  Secondary:  [address] at [address], [address] [place] [address]  [phone]   [phone]          DATE: 2019    Patient canceled her appointment today due to conflict. Will plan to follow up on next scheduled visit.       Ioana Rdz, PT, DPT, OCS

## 2019-12-02 ENCOUNTER — HOSPITAL ENCOUNTER (OUTPATIENT)
Dept: PHYSICAL THERAPY | Age: 49
Discharge: HOME | End: 2019-12-02
Payer: OTHER GOVERNMENT

## 2019-12-02 NOTE — PROGRESS NOTES
Anabelle Faustina  : 1970  Primary: [facility] [place] [facility]  Secondary:  Therapy Center at [address] [place] [address]  [phone]   [phone]          DATE: 2019    Patient canceled her appointment today due to taking care of mother. Will plan to follow up on next scheduled visit.       Azra Moralez, PT, DPT, OCS

## 2019-12-03 ENCOUNTER — HOSPITAL ENCOUNTER (OUTPATIENT)
Dept: PHYSICAL THERAPY | Age: 49
Discharge: HOME | End: 2019-12-03
Payer: OTHER GOVERNMENT

## 2019-12-03 PROCEDURE — 97140 MANUAL THERAPY 1/> REGIONS: CPT

## 2019-12-03 PROCEDURE — 97110 THERAPEUTIC EXERCISES: CPT

## 2019-12-03 NOTE — PROGRESS NOTES
[facility]  : 1970  Primary: [facility]  Secondary:  Therapy Center at [facility], [address] [facility]  [phone]   [phone]      OUTPATIENT PHYSICAL THERAPY: Daily Treatment Note 12/3/2019  Visit Count:  8    ICD-10: Treatment Diagnosis: Left shoulder pain (M25.512); Muscle weakness, generalized (M62.81)  Precautions/Allergies:   Patient has no known allergies. TREATMENT PLAN:  Effective Dates: 2019 TO 1/3/2020 (60 days). Frequency/Duration: 2-3 times a week for 60 Day(s)    Pre-treatment Symptoms/Complaints:  Shoulder is still really stiff, has had a lot going on with mother's health and holidays. Able to sleep better without as much pain. Has good and bad days  Pain: Initial: Pain Intensity 1: 410 Post Session:  10   Medications Last Reviewed:  12/3/2019  Updated Objective Findings:  Beginning of session active flexion 90 degrees  TREATMENT:     Therapeutic Exercise: (20 Minutes):  Exercises per grid below to improve mobility, strength and coordination. Required minimal visual, verbal and manual cues to promote proper body alignment and promote proper body mechanics. Progressed resistance, range, repetitions and complexity of movement as indicated. Date:  12/3/2019   Activity/Exercise Parameters   Pendulums - 8.8# KB x20-30   T-bar ER stretch sitting 10x10 seconds   Seated flexion rolls on foam roll with outward pressure into band 2x10   Foam roll perpendicular 2 minutes   R sidelying ball rolls into flexion with scapular assist from PT --   Scapular retraction - orange band --   Isometric walk outs ER, IR - orange band --   Pulleys - flexion --   Self mobilization with 8.8# - inferior glide seated 2x3 minutes     Manual Therapy (    Soft Tissue Mobilization Duration  Duration: 35 Minutes): Manual techniques to facilitate improved motion and decreased pain.  (Used abbreviations: MET - muscle energy technique; PNF - proprioceptive neuromuscular facilitation; NMR - neuromuscular re-education; a/p - anterior to posterior; p/a - posterior to anterior)   · Glenohumeral joint mobilizations - posterior/inferior direction  · Shoulder PROM  · Scapular mobility and soft tissue mobilizations to upper trap, medial scapula, lateral deltoid - IASTM  · AC, SC joint mobilizations  · Soft tissue mobilizations to subclavius    Modalities: ice pack to L shoulder at end of session - 10 minutes    Treatment/Session Summary:    · Response to Treatment:  Passive flexion 120 degrees in supine by end of session. AC/SC joint mobilizations to helped to decrease pinching sensation with flexion rolls on foam roll. Still very restricted in glenohumeral joint mobility but ROM improves with posterior cuff activation. · Communication/Consultation:  None today  · Equipment provided today:  None today  · Recommendations/Intent for next treatment session: Next visit will focus on soft tissue mobility, P/AROM.  Increase frequency to 3x/week to help improve P/AROM    Total Treatment Billable Duration:  20 minutes therex, 35 minutes manual  PT Patient Time In/Time Out  Time In: 1530  Time Out: 1640  Rosemead Sheets    Future Appointments   Date Time Provider Romi Carter   12/4/2019  3:30 PM Derestiven Baton [facility]   12/5/2019  2:30 PM [facility]   12/9/2019 10:30 AM Derrel Baton [facility]   12/10/2019 11:30 AM Derrel Baton [facility]   12/12/2019  2:30 PM Derrel Baton [facility]   12/16/2019  8:30 AM Derrel Baton [facility]   12/18/2019  9:30 AM Derrel Baton [facility]   12/19/2019  8:30 AM [facility]cy Alva [facility]   5/4/2020  9:15 AM PAUL [facility] ROOM 1 [facility]   9/30/2020  8:45 AM Taylor Hawkins MD [address]

## 2019-12-04 ENCOUNTER — HOSPITAL ENCOUNTER (OUTPATIENT)
Dept: PHYSICAL THERAPY | Age: 49
Discharge: HOME | End: 2019-12-04
Payer: OTHER GOVERNMENT

## 2019-12-04 PROCEDURE — 97140 MANUAL THERAPY 1/> REGIONS: CPT

## 2019-12-04 PROCEDURE — 97110 THERAPEUTIC EXERCISES: CPT

## 2019-12-04 NOTE — PROGRESS NOTES
Rozina Payton  : 1970  Primary: [facility]  Secondary:  Therapy Center at [place] [address], [address] [phone]   [phone]      OUTPATIENT PHYSICAL THERAPY: Daily Treatment Note 2019  Visit Count:  9    ICD-10: Treatment Diagnosis: Left shoulder pain (M25.512); Muscle weakness, generalized (M62.81)  Precautions/Allergies:   Patient has no known allergies. TREATMENT PLAN:  Effective Dates: 2019 TO 1/3/2020 (60 days). Frequency/Duration: 2-3 times a week for 60 Day(s)    Pre-treatment Symptoms/Complaints:  Shoulder was really sore last night but felt much better today with more motion  Pain: Initial: Pain Intensity 1: 3/10 Post Session:  3/10   Medications Last Reviewed:  2019  Updated Objective Findings:  See Progress Note from today  TREATMENT:     Therapeutic Exercise: (20 Minutes):  Exercises per grid below to improve mobility, strength and coordination. Required minimal visual, verbal and manual cues to promote proper body alignment and promote proper body mechanics. Progressed resistance, range, repetitions and complexity of movement as indicated. Date:  2019   Activity/Exercise Parameters   Pendulums - 8.8# KB x20-30   ER stretch standing against the wall 10x10 seconds   Seated flexion rolls on foam roll with outward pressure into band --   Foam roll perpendicular --   Supine active flexion with band around wrists for posterior cuff activation x10-15   Scapular retraction - orange band 2x20   Isometric walk outs ER, IR - orange band 2x15 each   Pulleys - flexion --   Self mobilization with 8.8# - inferior glide seated 2x3 minutes   Prone row 2x20             Manual Therapy (    Soft Tissue Mobilization Duration  Duration: 35 Minutes): Manual techniques to facilitate improved motion and decreased pain.  (Used abbreviations: MET - muscle energy technique; PNF - proprioceptive neuromuscular facilitation; NMR - neuromuscular re-education; a/p - anterior to posterior; p/a - posterior to anterior)   · Glenohumeral joint mobilizations - posterior/inferior direction  · Shoulder PROM  · Scapular mobility and soft tissue mobilizations to upper trap, medial scapula, lateral deltoid  · Soft tissue mobilizations to subclavius  · With written consent received and precautions reviewed, instrument-assisted soft tissue mobilization was performed to infraspinatus, anterior/lateral and posterior deltoid for the purpose of improving soft tissue mobility and blood flow with a positive treatment effect and no complications noted. Modalities: ice pack to L shoulder at end of session - 10 minutes    Treatment/Session Summary:    · Response to Treatment:  Srini Mi reported that manual work today helped to loosen up her shoulder, especially IASTM. She exhibited improve active flexion to 110 degrees compared to 90 degrees last visit. Still very restricted in glenohumeral joint mobility in posterior/inferior direction. She needs continued work on posterior cuff activation to help with humeral centering during flexion movement. · Communication/Consultation:  None today  · Equipment provided today:  None today  · Recommendations/Intent for next treatment session: Next visit will focus on soft tissue mobility, P/AROM.  Increase frequency to 3x/week to help improve P/AROM    Total Treatment Billable Duration:  20 minutes therex, 35 minutes manual  PT Patient Time In/Time Out  Time In: 7535  Time Out: 1630  Gennaro Servin    Future Appointments   Date Time Provider Romi Carter   12/5/2019  2:30 PM Fuad Whatley PT [facility]   12/9/2019 10:30 AM Herrera Reina [facility]   12/10/2019 11:30 AM Herrera Reina [facility]   12/12/2019  2:30 PM Herrera Reina [facility]   12/16/2019  8:30 AM Herrera Reina [facility]   12/18/2019  9:30 AM Phil FIELDS [facility]   12/19/2019  8:30 AM Can Candelaria Rosa Dexter, PT DOV [facility]   5/4/2020  9:15 AM ZANEE [facility]OM [facility]   9/30/2020  8:45 AM Barrera Hdz MD [facility] [address] [facility]

## 2019-12-05 ENCOUNTER — HOSPITAL ENCOUNTER (OUTPATIENT)
Dept: PHYSICAL THERAPY | Age: 49
Discharge: HOME | End: 2019-12-05
Payer: OTHER GOVERNMENT

## 2019-12-05 PROCEDURE — 97110 THERAPEUTIC EXERCISES: CPT

## 2019-12-05 PROCEDURE — 97140 MANUAL THERAPY 1/> REGIONS: CPT

## 2019-12-05 NOTE — PROGRESS NOTES
David Pinto  : 1970  Primary: [facility]  Secondary:  [address] at [place] [id number] [address], [address] [place] [address]  [phone]   [phone]      OUTPATIENT PHYSICAL THERAPY: Daily Treatment Note 2019  Visit Count:  10    ICD-10: Treatment Diagnosis: Left shoulder pain (M25.512); Muscle weakness, generalized (M62.81)  Precautions/Allergies:   Patient has no known allergies. TREATMENT PLAN:  Effective Dates: 2019 TO 1/3/2020 (60 days). Frequency/Duration: 2-3 times a week for 60 Day(s)    Pre-treatment Symptoms/Complaints:  Pt. Notes dry needling seemed to help with pain yesterday. Pt. Reports being 15 minutes late due to work activities. Pain: Initial: Pain Intensity 1: 3/10 Post Session:  3/10   Medications Last Reviewed:  2019  Updated Objective Findings:  None Today  TREATMENT:     Therapeutic Exercise: (15 Minutes):  Exercises per grid below to improve mobility, strength and coordination. Required minimal visual, verbal and manual cues to promote proper body alignment and promote proper body mechanics. Progressed resistance, range, repetitions and complexity of movement as indicated. Date:  2019   Activity/Exercise Parameters   Pendulums - 8.8# KB --   ER stretch standing against the wall 10x10 seconds   Seated flexion rolls on foam roll with outward pressure into band --   Foam roll perpendicular --   Supine active flexion with band around wrists for posterior cuff activation x10-15   Scapular retraction - orange band 2x20   Isometric walk outs ER, IR - orange band 2x15 each   Pulleys - flexion --   Self mobilization with 8.8# - inferior glide seated --   Prone row 2x20             Manual Therapy (    Soft Tissue Mobilization Duration  Duration: 30 Minutes): Manual techniques to facilitate improved motion and decreased pain.  (Used abbreviations: MET - muscle energy technique; PNF - proprioceptive neuromuscular facilitation; NMR - neuromuscular re-education; a/p - anterior to posterior; p/a - posterior to anterior)   · Glenohumeral joint mobilizations - posterior/inferior direction  · Shoulder PROM  · Scapular mobility and soft tissue mobilizations to upper trap, medial scapula, lateral deltoid  · Soft tissue mobilizations to subclavius (NOT PERFORMED)  · With written consent received and precautions reviewed, instrument-assisted soft tissue mobilization was performed to infraspinatus, anterior/lateral and posterior deltoid for the purpose of improving soft tissue mobility and blood flow with a positive treatment effect and no complications noted. (NOT PERFORMED  · Joint mobilization: prone cervicothoracic junction manipulation  · Joint mobilization: central p/a to thoracic spine grade III  · Soft tissue mobilization to L upper trapezius, levator scapulae, lateral scapular musculature. Modalities: ice pack to L shoulder at end of session - 10 minutes (NOT PERFORMED)    Treatment/Session Summary:    · Response to Treatment:  ROM for L shoulder ER and flexion improve today with manual therapy, but pain remains. Pt. will benefit from continuation of current program.   · Communication/Consultation:  None today  · Equipment provided today:  None today  · Recommendations/Intent for next treatment session: Next visit will focus on soft tissue mobility, P/AROM. Increase frequency to 3x/week to help improve P/AROM    Total Treatment Billable Duration:  45 minutes total time.    PT Patient Time In/Time Out  Time In: 6376  Time Out: 315 Rory Chowdhury, JAZMINE    Future Appointments   Date Time Provider Romi Carter   12/9/2019 10:30 AM Emy MONAE [facility]   12/10/2019 11:30 AM Emy MONAE [facility]   12/12/2019  2:30 PM Emy MONAE [facility]   12/16/2019  8:30 AM Emy MONAE [facility]   12/18/2019  9:30 AM Eym MONAE [facility]   12/19/2019  8:30 AM Skip Parsons C, PT [facility]   5/4/2020  9:15 AM SFE [facility] ROOM 1 [facility]   9/30/2020  8:45 AM Kaylin Swanson MD  [facility]

## 2019-12-09 ENCOUNTER — HOSPITAL ENCOUNTER (OUTPATIENT)
Dept: PHYSICAL THERAPY | Age: 49
Discharge: HOME | End: 2019-12-09
Payer: OTHER GOVERNMENT

## 2019-12-09 PROCEDURE — 97110 THERAPEUTIC EXERCISES: CPT

## 2019-12-09 PROCEDURE — 97140 MANUAL THERAPY 1/> REGIONS: CPT

## 2019-12-09 NOTE — PROGRESS NOTES
Sully Cm  : 1970  Primary: [facility]  Secondary:  Therapy Center at [place] [address], [address] [phone]   [phone]      OUTPATIENT PHYSICAL THERAPY: Daily Treatment Note 2019  Visit Count:  11    ICD-10: Treatment Diagnosis: Left shoulder pain (M25.512); Muscle weakness, generalized (M62.81)  Precautions/Allergies:   Patient has no known allergies. TREATMENT PLAN:  Effective Dates: 2019 TO 1/3/2020 (60 days). Frequency/Duration: 2-3 times a week for 60 Day(s)    Pre-treatment Symptoms/Complaints: Shoulder felt good after last session. Loosens up for about 24 hours and then tightens back down. Most pain in upper trap and lateral shoulder   Pain: Initial: Pain Intensity 1: 310 Post Session:  3/10   Medications Last Reviewed:  2019  Updated Objective Findings:  TP along infraspinatus and posterior deltoid  TREATMENT:     Therapeutic Exercise: (20 Minutes):  Exercises per grid below to improve mobility, strength and coordination. Required minimal visual, verbal and manual cues to promote proper body alignment and promote proper body mechanics. Progressed resistance, range, repetitions and complexity of movement as indicated. Date:  2019   Activity/Exercise Parameters   Pendulums - 8.8# KB --   ER stretch standing against the wall --   Standing TRX flexion stretch 5x10 seconds   Foam roll parallel 2 minutes   Supine active flexion with band around wrists for posterior cuff activation --   Scapular retraction - orange band standing against half foam 2x20   Isometric walk outs ER, IR - orange band --   Pulleys - flexion 3 minutes   Self mobilization with 8.8# - inferior glide seated 4 minutes   Prone row --   Sidelying ER 2x15         Manual Therapy (    Soft Tissue Mobilization Duration  Duration: 30 Minutes): Manual techniques to facilitate improved motion and decreased pain.  (Used abbreviations: MET - muscle energy technique; PNF - proprioceptive neuromuscular facilitation; NMR - neuromuscular re-education; a/p - anterior to posterior; p/a - posterior to anterior)   · Glenohumeral joint mobilizations - posterior/inferior direction  · Shoulder PROM  · Scapular mobility and soft tissue mobilizations to upper trap, medial scapula, lateral deltoid (NOT PERFORMED)  · Soft tissue mobilizations to subclavius   · With written consent received and precautions reviewed, instrument-assisted soft tissue mobilization was performed to infraspinatus, anterior and posterior deltoid, lats for the purpose of improving soft tissue mobility and blood flow with a positive treatment effect and no complications noted. · Joint mobilization: prone cervicothoracic junction manipulation (NOT PERFORMED)  · Joint mobilization: central p/a to thoracic spine grade III (NOT PERFORMED)  · Soft tissue mobilization to L upper trapezius, levator scapulae, lateral scapular musculature  · L cervical opening glides     Modalities: ice pack to L shoulder at end of session - 10 minutes     Treatment/Session Summary:    · Response to Treatment:  Fercho Cooper is able to go into more flexion with posterior cuff activation. Still very limited by glenohumeral joint restriction. Compensates into upper trap causing trigger points and decreased L cervical spine mobility. · Communication/Consultation:  None today  · Equipment provided today:  None today  · Recommendations/Intent for next treatment session: Next visit will focus on soft tissue mobility, P/AROM.  Increase frequency to 3x/week to help improve P/AROM    Total Treatment Billable Duration:  20 minutes therex, 30 minutes manual  PT Patient Time In/Time Out  Time In: 1030  Time Out: 1135  [facility]    Future Appointments   Date Time Provider Romi Carter   12/10/2019 11:30 AM Gilford Bergamo OFF [facility]   12/12/2019  2:30 PM Gilford Bergamo [facility]   12/16/2019  8:30 AM Brien Vaughn Schumacher [facility]   12/18/2019  9:30 AM Lorne Lao [facility]   12/19/2019  8:30 AM Tyler Luke PT [facility]   5/4/2020  9:15 AM PAUL [facility] ROOM 1 [facility]   9/30/2020  8:45 AM Raghu Birmingham MD [facility] [address]

## 2019-12-10 ENCOUNTER — HOSPITAL ENCOUNTER (OUTPATIENT)
Dept: PHYSICAL THERAPY | Age: 49
Discharge: HOME | End: 2019-12-10
Payer: OTHER GOVERNMENT

## 2019-12-10 PROCEDURE — 97140 MANUAL THERAPY 1/> REGIONS: CPT

## 2019-12-10 PROCEDURE — 97110 THERAPEUTIC EXERCISES: CPT

## 2019-12-10 NOTE — PROGRESS NOTES
Bebe Trejo  : 1970  Primary: [facility]  Secondary:  Therapy Center at [place] [address], [address] [phone]   [phone]      OUTPATIENT PHYSICAL THERAPY: Daily Treatment Note 12/10/2019  Visit Count:  12    ICD-10: Treatment Diagnosis: Left shoulder pain (M25.512); Muscle weakness, generalized (M62.81)  Precautions/Allergies:   Patient has no known allergies. TREATMENT PLAN:  Effective Dates: 2019 TO 1/3/2020 (60 days). Frequency/Duration: 2-3 times a week for 60 Day(s)    Pre-treatment Symptoms/Complaints: Upper trap has been really painful since yesterday's session. She had a hard time sleeping and getting comfortable. Pain: Initial: Pain Intensity 1: 5/10 Post Session:  3/10   Medications Last Reviewed:  12/10/2019  Updated Objective Findings:  Passive ER 55 degrees  TREATMENT:     Therapeutic Exercise: (20 Minutes):  Exercises per grid below to improve mobility, strength and coordination. Required minimal visual, verbal and manual cues to promote proper body alignment and promote proper body mechanics. Progressed resistance, range, repetitions and complexity of movement as indicated. Date:  12/10/2019   Activity/Exercise Parameters   Pendulums - 8.8# KB --   ER stretch standing against the wall --   Standing TRX flexion stretch --   Foam roll parallel --   Supine active flexion with ER pressure into therapists hand 2x10   Rows - red band 2x20   Isometric walk outs ER, IR - orange band x30-40 each   Pulleys - flexion --   Self mobilization with 8.8# - inferior glide seated 4 minutes   Prone row --   Sidelying ER --   Rhythmic stabilizations 5 minutes     Manual Therapy (    Soft Tissue Mobilization Duration  Duration: 30 Minutes): Manual techniques to facilitate improved motion and decreased pain.  (Used abbreviations: MET - muscle energy technique; PNF - proprioceptive neuromuscular facilitation; NMR - neuromuscular re-education; a/p - anterior to posterior; p/a - posterior to anterior)   · Glenohumeral joint mobilizations - posterior/inferior direction  · Shoulder PROM  · Scapular mobility and soft tissue mobilizations to upper trap, medial scapula, lateral deltoid (NOT PERFORMED)  · Soft tissue mobilizations to subclavius   · Joint mobilization: prone cervicothoracic junction manipulation (NOT PERFORMED)  · Joint mobilization: central p/a to thoracic spine grade III (NOT PERFORMED)  · Soft tissue mobilization to L upper trapezius, levator scapulae, lateral scapular musculature  · L cervical opening glides     Modalities: ice pack to L shoulder at end of session - 10 minutes     Treatment/Session Summary:    · Response to Treatment:  After manual work Josefina reported some decrease in L upper trap pain. She is more than likely experiencing DOMS affect from dry needling to upper trap last visit. ER ROM improved today. Resisted IR make shoulder feel better as it helps to center her humeral head.   · Communication/Consultation:  None today  · Equipment provided today:  None today  · Recommendations/Intent for next treatment session: Next visit will focus on soft tissue mobility, P/AROM, rotator cuff activation/strengthening    Total Treatment Billable Duration:  20 minutes therex, 30 minutes manual  PT Patient Time In/Time Out  Time In: 1115  Time Out: 1220  Becky Jenkins    Future Appointments   Date Time Provider Romi Carter   12/12/2019  2:30 PM Bayron Dunbar [facility]   12/16/2019  8:30 AM Bayron Dunbar [facility]   12/18/2019  9:30 AM Bayron Dunbar [facility]   12/19/2019  8:30 AM Roxanne Colunga PT OFF [facility]   5/4/2020  9:15 AM PAUL [facility] ROOM 1 NATE MELISSA   9/30/2020  8:45 AM Cora Pulido MD [address]

## 2019-12-12 ENCOUNTER — HOSPITAL ENCOUNTER (OUTPATIENT)
Dept: PHYSICAL THERAPY | Age: 49
Discharge: HOME | End: 2019-12-12
Payer: OTHER GOVERNMENT

## 2019-12-12 PROCEDURE — 97140 MANUAL THERAPY 1/> REGIONS: CPT

## 2019-12-12 PROCEDURE — 97110 THERAPEUTIC EXERCISES: CPT

## 2019-12-12 NOTE — PROGRESS NOTES
Anastasia Pulido  : 1970  Primary: [facility]  Secondary:  [address] at [place] [id number] [address], [address] [place] [address]  [phone]   [phone]      OUTPATIENT PHYSICAL THERAPY: Daily Treatment Note 2019  Visit Count:  13    ICD-10: Treatment Diagnosis: Left shoulder pain (M25.512); Muscle weakness, generalized (M62.81)  Precautions/Allergies:   Patient has no known allergies. TREATMENT PLAN:  Effective Dates: 2019 TO 1/3/2020 (60 days). Frequency/Duration: 2-3 times a week for 60 Day(s)    Pre-treatment Symptoms/Complaints: Upper trap feels better. Shoulder was really good yesterday but feels more tight today. Pain: Initial: Pain Intensity 1: 4/10 Post Session:  3/10   Medications Last Reviewed:  2019  Updated Objective Findings:  Standing active flexion 128 at end of session  TREATMENT:     Therapeutic Exercise: (20 Minutes):  Exercises per grid below to improve mobility, strength and coordination. Required minimal visual, verbal and manual cues to promote proper body alignment and promote proper body mechanics. Progressed resistance, range, repetitions and complexity of movement as indicated. Date:  2019   Activity/Exercise Parameters   Pendulums - 8.8# KB x20-30   Flexion slides up wall with hands in pillow case 2x10   Standing TRX flexion stretch --   VINAY 90-90 lift with arms up at shoulder height 5x5 breaths   Supine active flexion with ER pressure into therapists hand 2x10   Rows - red band --   Isometric walk outs ER, IR - orange band x30-40 each   VINAY - quadruped breathing with pushing out 5x5 breaths   Self mobilization with 8.8# - inferior glide seated 4 minutes   Prone row --   Sidelying ER 2xfatigue   Rhythmic stabilizations --     Manual Therapy (    Soft Tissue Mobilization Duration  Duration: 30 Minutes): Manual techniques to facilitate improved motion and decreased pain.  (Used abbreviations: MET - muscle energy technique; PNF - proprioceptive neuromuscular facilitation; NMR - neuromuscular re-education; a/p - anterior to posterior; p/a - posterior to anterior)   · Glenohumeral joint mobilizations - posterior/inferior direction  · Shoulder PROM  · Scapular mobility and soft tissue mobilizations to upper trap, medial scapula, lateral deltoid (NOT PERFORMED)  · Soft tissue mobilizations to subclavius   · Joint mobilization: prone cervicothoracic junction manipulation (NOT PERFORMED)  · Joint mobilization: central p/a to thoracic spine grade III (NOT PERFORMED)  · Soft tissue mobilization to L upper trapezius, levator scapulae, lateral scapular musculature  · L cervical opening glides; 1st rib mobilizations    Modalities: ice pack to L shoulder at end of session - 10 minutes     Treatment/Session Summary:    · Response to Treatment:  L inferior rib angle was more narrow compared to R side. This normalized after VINAY breathing techniques with immediate increase in overhead mobility of L UE. She ended the session at 128 degrees of active flexion with some upper trap substitution.    · Communication/Consultation:  None today  · Equipment provided today:  None today  · Recommendations/Intent for next treatment session: Next visit will focus on soft tissue mobility, P/AROM, rotator cuff activation/strengthening, VINAY techniques for rib cage positioning    Total Treatment Billable Duration:  20 minutes therex, 30 minutes manual  PT Patient Time In/Time Out  Time In: 1430  Time Out: 1535  [facility] Appointments   Date Time Provider Romi Carter   12/16/2019  8:30 AM Kerry Constantine    12/18/2019  9:30 AM Kerry Constantine    12/19/2019  8:30 AM Briana Marcus, PT    5/4/2020  9:15 AM S[facility] ROOM 1 [facility]   9/30/2020  8:45 AM Sis Simms MD  [facility]

## 2019-12-16 ENCOUNTER — HOSPITAL ENCOUNTER (OUTPATIENT)
Dept: PHYSICAL THERAPY | Age: 49
Discharge: HOME | End: 2019-12-16
Payer: OTHER GOVERNMENT

## 2019-12-16 NOTE — PROGRESS NOTES
Amanda Modi  : 1970  Primary: [facility]  Secondary:  Therapy Center at [address] [place] [address], [address]  [phone]   [phone]          DATE: 2019    Patient canceled her appointment today due to taking care of her ill mother. Will plan to follow up on next scheduled visit.       Susan Marroquin, PT, DPT, OCS

## 2019-12-18 ENCOUNTER — HOSPITAL ENCOUNTER (OUTPATIENT)
Dept: PHYSICAL THERAPY | Age: 49
Discharge: HOME | End: 2019-12-18
Payer: OTHER GOVERNMENT

## 2019-12-18 PROCEDURE — 97140 MANUAL THERAPY 1/> REGIONS: CPT

## 2019-12-18 PROCEDURE — 97110 THERAPEUTIC EXERCISES: CPT

## 2019-12-18 NOTE — PROGRESS NOTES
Bebe Trejo  : 1970  Primary: [facility]  Secondary:  [address] at [address], [address] [place] [address]  [phone]   [phone]      OUTPATIENT PHYSICAL THERAPY: Daily Treatment Note 2019  Visit Count:  14    ICD-10: Treatment Diagnosis: Left shoulder pain (M25.512); Muscle weakness, generalized (M62.81)  Precautions/Allergies:   Patient has no known allergies. TREATMENT PLAN:  Effective Dates: 2019 TO 1/3/2020 (60 days). Frequency/Duration: 2-3 times a week for 60 Day(s)    Pre-treatment Symptoms/Complaints: Pain is much improved, no longer have constant throbbing pain. Sleeping better. More stiffness than pain. Pain: Initial: Pain Intensity 1: 210 Post Session:  2/10   Medications Last Reviewed:  2019  Updated Objective Findings:  None Today  TREATMENT:     Therapeutic Exercise: (20 Minutes):  Exercises per grid below to improve mobility, strength and coordination. Required minimal visual, verbal and manual cues to promote proper body alignment and promote proper body mechanics. Progressed resistance, range, repetitions and complexity of movement as indicated. Date:  2019   Activity/Exercise Parameters   Pendulums - 8.8# KB --   Flexion lying inclined with band  2x10   Standing TRX flexion stretch --   VINAY 90-90 lift with arms up at shoulder height --   Supine active flexion with ER pressure into therapists hand x10   Rows - red band 2x20   Isometric walk outs ER, IR - orange band --   VINAY - quadruped breathing with pushing out --   Self mobilization with 8.8# - inferior glide seated 4 minutes   Prone row --   Sidelying ER - 1# 2xfatigue   Rhythmic stabilizations 3 minutes   UBE 4 minutes     Manual Therapy (    Soft Tissue Mobilization Duration  Duration: 30 Minutes): Manual techniques to facilitate improved motion and decreased pain.  (Used abbreviations: MET - muscle energy technique; PNF - proprioceptive neuromuscular facilitation; NMR - neuromuscular re-education; a/p - anterior to posterior; p/a - posterior to anterior)   · Glenohumeral joint mobilizations - posterior/inferior direction  · Shoulder PROM  · Scapular mobility and soft tissue mobilizations to upper trap, medial scapula, lateral deltoid   · Soft tissue mobilizations to subclavius   · Joint mobilization: prone cervicothoracic junction manipulation (NOT PERFORMED)  · Joint mobilization: central p/a to thoracic spine grade III (NOT PERFORMED)  · L cervical opening glides; 1st rib mobilizations    Modalities: ice pack to L shoulder at end of session - 10 minutes     Treatment/Session Summary:    · Response to Treatment:  Bibiana Ortiz seems to have turned a corner and is experiencing less pain and improve P/AROM. She was able to progress flexion exercises in more gravity resisted positions. Posterior cuff strength is improving.   · Communication/Consultation:  None today  · Equipment provided today:  None today  · Recommendations/Intent for next treatment session: Next visit will focus on soft tissue mobility, P/AROM, rotator cuff activation/strengthening, VINAY techniques for rib cage positioning    Total Treatment Billable Duration:  20 minutes therex, 30 minutes manual  PT Patient Time In/Time Out  Time In: 0930  Time Out: 1035  Bouse Sheets    Future Appointments   Date Time Provider Romi Carter   12/19/2019 11:30 AM Derrel Baton [facility]   12/30/2019 12:00 PM Derrel Baton [facility]   1/2/2020  2:30 PM Derrel Baton [facility]   1/8/2020  9:30 AM Derrel Baton [facility]   5/4/2020  9:15 AM [facility] 1 [facility]   9/30/2020  8:45 AM Taylor Hawkins MD [facility] [address] [facility]

## 2019-12-19 ENCOUNTER — HOSPITAL ENCOUNTER (OUTPATIENT)
Dept: PHYSICAL THERAPY | Age: 49
Discharge: HOME | End: 2019-12-19
Payer: OTHER GOVERNMENT

## 2019-12-19 ENCOUNTER — APPOINTMENT (OUTPATIENT)
Dept: PHYSICAL THERAPY | Age: 49
End: 2019-12-19
Payer: OTHER GOVERNMENT

## 2019-12-19 PROCEDURE — 97140 MANUAL THERAPY 1/> REGIONS: CPT

## 2019-12-19 PROCEDURE — 97110 THERAPEUTIC EXERCISES: CPT

## 2019-12-19 NOTE — PROGRESS NOTES
Stacie Schmidt  : 1970  Primary: [facility]  Secondary:  Therapy Center at [address], [address] [place] [address]  [phone]   [phone]      OUTPATIENT PHYSICAL THERAPY: Daily Treatment Note 2019  Visit Count:  15    ICD-10: Treatment Diagnosis: Left shoulder pain (M25.512); Muscle weakness, generalized (M62.81)  Precautions/Allergies:   Patient has no known allergies. TREATMENT PLAN:  Effective Dates: 2019 TO 1/3/2020 (60 days). Frequency/Duration: 2-3 times a week for 60 Day(s)    Pre-treatment Symptoms/Complaints: A little more stiff and sore from session yesterday. Pain: Initial: Pain Intensity 1: 3/10 Post Session:  2/10   Medications Last Reviewed:  2019  Updated Objective Findings:  Passive ER 50 degrees  TREATMENT:     Therapeutic Exercise: (20 Minutes):  Exercises per grid below to improve mobility, strength and coordination. Required minimal visual, verbal and manual cues to promote proper body alignment and promote proper body mechanics. Progressed resistance, range, repetitions and complexity of movement as indicated. Date:  2019   Activity/Exercise Parameters   Pendulums - 8.8# KB --   Flexion slides up wall hands in pillow case  2x10   Standing TRX flexion stretch --   VINAY 90-90 lift with arms up at shoulder height --   Supine active flexion with ER pressure into therapists hand --   Rows - red band 2x20   Isometric walk outs ER, IR - green band 2x20 bilateral   VINAY - quadruped breathing with pushing out 5x5 breaths   Self mobilization with 8.8# - inferior glide seated 4 minutes   ER stretching seated with arm on table leaning forward --   Sidelying ER - 1# --   Rhythmic stabilizations 3 minutes   UBE 4 minutes     Manual Therapy (    Soft Tissue Mobilization Duration  Duration: 30 Minutes): Manual techniques to facilitate improved motion and decreased pain.  (Used abbreviations: MET - muscle energy technique; PNF - proprioceptive neuromuscular facilitation; NMR - neuromuscular re-education; a/p - anterior to posterior; p/a - posterior to anterior)   · Glenohumeral joint mobilizations - posterior/inferior direction  · Shoulder PROM  · Scapular mobility and soft tissue mobilizations to upper trap, medial scapula, lateral deltoid   · Soft tissue mobilizations to subclavius   · Joint mobilization: prone cervicothoracic junction manipulation (NOT PERFORMED)  · Joint mobilization: central p/a to thoracic spine grade III (NOT PERFORMED)  · L cervical opening glides; 1st rib mobilizations    Modalities: ice pack to L shoulder at end of session - 5 minutes     Treatment/Session Summary:    · Response to Treatment:  Josefina reported more superior and lateral shoulder pain with stretching today. Avoided excessive overhead strengthening to prevent more soreness. She tolerated other strengthening exercises well and able to increase resistance with ER strengthening. · Communication/Consultation:  None today  · Equipment provided today:  None today  · Recommendations/Intent for next treatment session: Next visit will focus on soft tissue mobility, P/AROM, rotator cuff activation/strengthening, VINAY techniques for rib cage positioning.  Patient will work on indep HEP over the next week    Total Treatment Billable Duration:  20 minutes therex, 30 minutes manual  PT Patient Time In/Time Out  Time In: 1130  Time Out: 1230  Sheliah Severance Future Appointments   Date Time Provider Romi Carter   12/30/2019 12:00 PM John Aragon [facility]   1/2/2020  2:30 PM John Jeanm [facility]   1/8/2020  9:30 AM John Aragon [facility]   5/4/2020  9:15 AM SFE [facility] ROOM 1 [facility]ALIS[facility] E   9/30/2020  8:45 AM Mick Sandhu MD [facility]

## 2019-12-30 ENCOUNTER — HOSPITAL ENCOUNTER (OUTPATIENT)
Dept: PHYSICAL THERAPY | Age: 49
Discharge: HOME | End: 2019-12-30
Payer: OTHER GOVERNMENT

## 2019-12-30 PROCEDURE — 97110 THERAPEUTIC EXERCISES: CPT

## 2019-12-30 PROCEDURE — 97140 MANUAL THERAPY 1/> REGIONS: CPT

## 2019-12-30 NOTE — PROGRESS NOTES
Anthony Melendez  : 1970  Primary: [facility]  Secondary:  Therapy Center at [facility], [address] [facility]  [phone]   [phone]      OUTPATIENT PHYSICAL THERAPY: Daily Treatment Note 2019  Visit Count:  16    ICD-10: Treatment Diagnosis: Left shoulder pain (M25.512); Muscle weakness, generalized (M62.81)  Precautions/Allergies:   Patient has no known allergies. TREATMENT PLAN:  Effective Dates: 2019 TO 1/3/2020 (60 days). Frequency/Duration: 2-3 times a week for 60 Day(s)    Pre-treatment Symptoms/Complaints: More stiff in shoulder since being out of therapy for a few days  Pain: Initial: Pain Intensity 1: 3/10 Post Session:  2/10   Medications Last Reviewed:  2019  Updated Objective Findings:  None Today  TREATMENT:     Therapeutic Exercise: (20 Minutes):  Exercises per grid below to improve mobility, strength and coordination. Required minimal visual, verbal and manual cues to promote proper body alignment and promote proper body mechanics. Progressed resistance, range, repetitions and complexity of movement as indicated. Date:  2019   Activity/Exercise Parameters   Pendulums - 8.8# KB --   Flexion slides up wall hands in pillow case  2x10   Standing TRX flexion stretch --   VINAY 90-90 lift with arms up at shoulder height --   Standing ER and flexion - orange band 2x10   Bent over rows and extensions -5 and1# 2x15   Isometric walk outs ER, IR - green band --   VINAY - quadruped breathing with pushing out --   Self mobilization with 8.8# - inferior glide seated 4 minutes   D1/D2 PNF patterns - supine 4 minutes   Sidelying ER - 1# 2xfatigue   Rhythmic stabilizations 3 minutes   UBE 4 minutes     Manual Therapy (    Soft Tissue Mobilization Duration  Duration: 30 Minutes): Manual techniques to facilitate improved motion and decreased pain.  (Used abbreviations: MET - muscle energy technique; PNF - proprioceptive neuromuscular facilitation; NMR - neuromuscular re-education; a/p - anterior to posterior; p/a - posterior to anterior)   · Glenohumeral joint mobilizations - posterior/inferior direction  · Shoulder PROM  · Scapular mobility and soft tissue mobilizations to upper trap, medial scapula, lateral deltoid   · Soft tissue mobilizations to subclavius   · Joint mobilization: prone cervicothoracic junction manipulation (NOT PERFORMED)  · Joint mobilization: central p/a to thoracic spine grade III (NOT PERFORMED)  · L cervical opening glides; 1st rib mobilizations    Modalities: ice pack to L shoulder at end of session - 5 minutes     Treatment/Session Summary:    · Response to Treatment:  Josefina did seem more restricted in L shoulder mobility compared to last week. She needs continued work on glenohumeral joint and soft tissue mobility. She fatigues quickly with posterior cuff strengthening. · Communication/Consultation:  None today  · Equipment provided today:  None today  · Recommendations/Intent for next treatment session: Next visit will focus on soft tissue mobility, P/AROM, rotator cuff activation/strengthening, VINAY techniques for rib cage positioning.     Total Treatment Billable Duration:  20 minutes therex, 30 minutes manual  PT Patient Time In/Time Out  Time In: 1200  Time Out: 1300  Jaime Wilks    Future Appointments   Date Time Provider Romi Carter   1/2/2020  2:30 PM Winsome [facility]   1/8/2020  9:30 AM Wins[facility]   5/4/2020  9:15 AM PAUL [facility] ROOM 1 [facility]   9/30/2020  8:45 AM Logan Rao MD [facility] [address] [facility]

## 2020-01-02 ENCOUNTER — HOSPITAL ENCOUNTER (OUTPATIENT)
Dept: PHYSICAL THERAPY | Age: 50
Discharge: HOME | End: 2020-01-02
Payer: OTHER GOVERNMENT

## 2020-01-02 PROCEDURE — 97140 MANUAL THERAPY 1/> REGIONS: CPT

## 2020-01-02 PROCEDURE — 97110 THERAPEUTIC EXERCISES: CPT

## 2020-01-02 NOTE — PROGRESS NOTES
Anthony Melendez  : 1970  Primary: [facility]  Secondary:  Therapy Center at [facility], [address] [facility]  [phone]   [phone]      OUTPATIENT PHYSICAL THERAPY: Daily Treatment Note 2020  Visit Count:  17    ICD-10: Treatment Diagnosis: Left shoulder pain (M25.512); Muscle weakness, generalized (M62.81)  Precautions/Allergies:   Patient has no known allergies. TREATMENT PLAN:  Effective Dates: 2019 TO 1/3/2020 (60 days). Frequency/Duration: 2-3 times a week for 60 Day(s)    Pre-treatment Symptoms/Complaints: Shoulder feels better compared to last visit, not as sore  Pain: Initial: Pain Intensity 1: 2/10 Post Session:  2/10   Medications Last Reviewed:  2020  Updated Objective Findings:  None Today  TREATMENT:     Therapeutic Exercise: (20 Minutes):  Exercises per grid below to improve mobility, strength and coordination. Required minimal visual, verbal and manual cues to promote proper body alignment and promote proper body mechanics. Progressed resistance, range, repetitions and complexity of movement as indicated. Date:  2020   Activity/Exercise Parameters   Foam roll - parallel 2-3 minutes   Active flexion supine with plinth inclined to 60 degrees holding band 2xfatigue   Standing TRX flexion stretch --   VINAY 90-90 lift with arms up at shoulder height --   Standing ER and IR walkouts 2x15 each   Bent over rows and extensions -5 and1# --   Standing rows - red band 2x20   VINAY - quadruped breathing with pushing out 5x5 breaths   Self mobilization with 8.8# - inferior glide seated --   D1/D2 PNF patterns - supine 4 minutes   Sidelying ER - resistance from PT 2xfatigue   Rhythmic stabilizations 3 minutes   UBE 4 minutes     Manual Therapy (    Soft Tissue Mobilization Duration  Duration: 30 Minutes): Manual techniques to facilitate improved motion and decreased pain.  (Used abbreviations: MET - muscle energy technique; PNF - proprioceptive neuromuscular facilitation; NMR - neuromuscular re-education; a/p - anterior to posterior; p/a - posterior to anterior)   · Glenohumeral joint mobilizations - posterior/inferior direction  · Shoulder PROM  · Scapular mobility and soft tissue mobilizations to upper trap, medial scapula, lateral deltoid   · Soft tissue mobilizations to subclavius (not performed)  · Joint mobilization: prone cervicothoracic junction manipulation (NOT PERFORMED)  · Joint mobilization: central p/a to thoracic spine grade III  · L cervical opening glides; 1st rib mobilizations    Modalities: none    Treatment/Session Summary:    · Response to Treatment:  Josefina tolerated treatment well today. Posterior cuff is activating better with strengthening exercises but fatigues quickly. Still has moderate restriction in lats and other posterior shoulder musculature making it difficult for her to reach overhead fully. · Communication/Consultation:  None today  · Equipment provided today:  None today  · Recommendations/Intent for next treatment session: Next visit will focus on soft tissue mobility, P/AROM, rotator cuff activation/strengthening, VINAY techniques for rib cage positioning.     Total Treatment Billable Duration:  20 minutes therex, 30 minutes manual  PT Patient Time In/Time Out  Time In: 1430  Time Out: 1530  [facility]    Future Appointments   Date Time Provider Romi Carter   1/8/2020  9:30 AM Yanci [facility]   5/4/2020  9:15 AM PAUL [facility] ROOM 1 [facility]   9/30/2020  8:45 AM Florencio Serrano MD [address]

## 2020-01-08 ENCOUNTER — HOSPITAL ENCOUNTER (OUTPATIENT)
Dept: PHYSICAL THERAPY | Age: 50
Discharge: HOME | End: 2020-01-08
Payer: OTHER GOVERNMENT

## 2020-01-08 PROCEDURE — 97140 MANUAL THERAPY 1/> REGIONS: CPT

## 2020-01-08 PROCEDURE — 97110 THERAPEUTIC EXERCISES: CPT

## 2020-01-08 NOTE — PROGRESS NOTES
Candido Colleen  : 1970  Primary: [facility]  Secondary:  [address] at [facility], [address] [facility]  [phone]   [phone]      OUTPATIENT PHYSICAL THERAPY: Daily Treatment Note 2020  Visit Count:  18    ICD-10: Treatment Diagnosis: Left shoulder pain (M25.512); Muscle weakness, generalized (M62.81)  Precautions/Allergies:   Patient has no known allergies. TREATMENT PLAN:  Effective Dates: 2020 TO 2020 (90 days). Frequency/Duration: 2 times a week for 90 Day(s)    Pre-treatment Symptoms/Complaints: Shoulder is really stiff and sore from not doing much over the last few days due to recent election  Pain: Initial: Pain Intensity 1: 310 Post Session:  2/10   Medications Last Reviewed:  2020  Updated Objective Findings:  See evaluation note from today  TREATMENT:     Therapeutic Exercise: (20 Minutes):  Exercises per grid below to improve mobility, strength and coordination. Required minimal visual, verbal and manual cues to promote proper body alignment and promote proper body mechanics. Progressed resistance, range, repetitions and complexity of movement as indicated. Date:  2020   Activity/Exercise Parameters   Foam roll - perpendicular 4 minutes   Active flexion supine with plinth inclined to 60 degrees holding band --   Standing TRX flexion stretch x5   VINAY 90-90 lift with arms up at shoulder height --   Standing ER and IR walkouts --   Bent over rows and extensions -5 and1# --   Standing rows - red band 2x20   VINAY - quadruped breathing with pushing out --   Self mobilization with 8.8# - inferior glide seated 4 minutes   D1/D2 PNF patterns - supine --   Sidelying ER 2xfatigue   Rhythmic stabilizations 3 minutes   UBE 4 minutes   ER stretch 3 minutes     Manual Therapy (    Soft Tissue Mobilization Duration  Duration: 30 Minutes): Manual techniques to facilitate improved motion and decreased pain.  (Used abbreviations: MET - muscle energy technique; PNF - proprioceptive neuromuscular facilitation; NMR - neuromuscular re-education; a/p - anterior to posterior; p/a - posterior to anterior)   · Glenohumeral joint mobilizations - posterior/inferior direction  · Shoulder PROM  · Scapular mobility and soft tissue mobilizations to upper trap, medial scapula, lateral deltoid   · Soft tissue mobilizations to subclavius  · Joint mobilization: prone cervicothoracic junction manipulation (NOT PERFORMED)  · Joint mobilization: central p/a to thoracic spine grade III  · L cervical opening glides; 1st rib mobilizations    Modalities: none    Treatment/Session Summary:    · Response to Treatment:  Fleming was more stiff today in soft tissue and joint mobility today. It was painful to push to full ER and flexion. Stayed more below shoulder height with exercises. · Communication/Consultation:  None today  · Equipment provided today:  None today  · Recommendations/Intent for next treatment session: Next visit will focus on soft tissue mobility, P/AROM, rotator cuff activation/strengthening, VINAY techniques for rib cage positioning.     Total Treatment Billable Duration:  20 minutes therex, 30 minutes manual  PT Patient Time In/Time Out  Time In: 0930  Time Out: 1030  [facility]    Future Appointments   Date Time Provider Romi Carter   1/9/2020  9:30 AM Ricci FIELDS [facility]   1/15/2020  9:30 AM Russella Shear [facility]   1/16/2020 11:30 AM Russella Shear [facility]   1/21/2020 11:30 AM Russella Shear [facility]   1/22/2020  9:30 AM Russella Shear [facility]   1/27/2020  9:30 AM Russella Shear [facility]   1/28/2020 11:30 AM Russella Shear [facility]   5/4/2020  9:15 AM SFE [facility] ROOM 1 [facility]   9/30/2020  8:45 AM Rohan Villagomez MD  [facility]

## 2020-01-08 NOTE — THERAPY RECERTIFICATION
Nic Barboza  : 1970  Primary: [facility]  Secondary:  Therapy Center at [address]  [phone]   [phone]        OUTPATIENT PHYSICAL THERAPY:Recertification 7001   ICD-10: Treatment Diagnosis: Left shoulder pain (M25.512); Muscle weakness, generalized (M62.81)  Precautions/Allergies:   Patient has no known allergies. TREATMENT PLAN:  Effective Dates: 2020 TO 2020 (90 days). Frequency/Duration: 2 times a week for 90 Day(s) MEDICAL/REFERRING DIAGNOSIS:  Pain in left shoulder [M25.512]   DATE OF ONSET: 10/7/19  REFERRING PHYSICIAN: Meme Jaramillo MD MD Orders: Marry Perry and Treat  Return MD Appointment: TBD     UPDATE (20): Jennyfer continues to struggle with regaining functional passive and active shoulder ROM and strength. She will have good and bad days depending on her level of activity and how much stretching she has done. She is most restricted in glenohumeral joint mobility in posterior and inferior direction, soft tissue restriction of posterior shoulder, upper traps and lats, and decreased posterior cuff strength preventing adequate glenohumeral centering. She is most limited lifting overhead and behind her back making it difficult for her to complete certain ADL and functional activities. She will benefit from continued skilled therapy to address these mobility limitations to return to a premorbid level of function. PROBLEM LIST (Impacting functional limitations):  1. Decreased Strength  2. Decreased ADL/Functional Activities  3. Increased Pain  4. Decreased Activity Tolerance  5. Decreased Flexibility/Joint Mobility  6. Decreased Knowledge of Precautions  7. Decreased [place] with Home Exercise Program INTERVENTIONS PLANNED: (Treatment may consist of any combination of the following)  1. Cold  2. Home Exercise Program (HEP)  3. Manual Therapy  4.  Neuromuscular Re-education/Strengthening  5. Range of Motion (ROM)  6. Therapeutic Activites  7. Therapeutic Exercise/Strengthening     GOALS: (Goals have been discussed and agreed upon with patient.)  Discharge Goals: Time Frame: 11/4/19 - 1/3/20  1. Patient will be independent with home exercise program without assistance from therapist. Lorenzo Myers  2. Patient will report 15/55 Quick DASH score to demonstrate improved functional capacity. NO CHANGE  3. 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 SOME PROGRESSION  4. Patient will demonstrate L passive ER to 80 degrees to demonstrate improved functional mobility. SOME PROGRESSION  5. Patient will demonstrate 4+/5 L ER and scaption strength to allow her to return to return to light UE workouts in the gym. SOME PROGRESSION      OUTCOME MEASURE:   Tool Used: Disabilities of the Arm, Shoulder and Hand (DASH) Questionnaire - Quick Version  Score:  Initial: 27/55 (11/4/19)  28/55 (Date: 12/5/19 ) 30/55 (1/8/20)   Interpretation of Score: The DASH is designed to measure the activities of daily living in person's with upper extremity dysfunction or pain. Each section is scored on a 1-5 scale, 5 representing the greatest disability. The scores of each section are added together for a total score of 55. MEDICAL NECESSITY:   · Patient is expected to demonstrate progress in strength, range of motion, coordination and functional technique to increase independence with bathing, dressing, lifting and carrying. REASON FOR SERVICES/OTHER COMMENTS:  · Patient continues to require present interventions due to patient's inability to fully participate in all ADL and functional activities.   Total Duration:  PT Patient Time In/Time Out  Time In: 0930  Time Out: 1030    Rehabilitation Potential For Stated Goals: Excellent  Regarding Justus Stark's therapy, I certify that the treatment plan above will be carried out by a therapist or under their direction. Thank you for this referral,  Guera Jansen     Referring Physician Signature: Magy Stahl MD                 PAIN/SUBJECTIVE:   Initial: Pain Intensity 1: 3/10 Post Session:  5/10   HISTORY:   History of Injury/Illness (Reason for Referral):  Mariela Lee presents to physical therapy today with complaints of L shoulder pain secondary to arthroscopic surgery on 10/7/19. She was having shoulder issues since March with no particular SELENE. She had 2 injections and a round of PT to try and prevent surgery but she continued to have pain early in the morning and at night when trying to sleep. She underwent a subacromial decompression with no involvement of the labrum, rotator cuff or biceps tendon according to patient. She reports she was doing well until this past Friday she did light bicep curls and had her  massage her shoulder and it seemed to increase her pain in biceps region. No reports of numbness/tingling in L UE. She currently uses heat and motrin prn. She wants to return to working out with her  at the gym and hiking with her family. Past Medical History/Comorbidities:   Ms. John Whitaker  has a past medical history of Migraine. She also has no past medical history of Adverse effect of anesthesia, Difficult intubation, or Nausea & vomiting. Ms. John Whitaker  has a past surgical history that includes implant breast silicone/eq; hx tonsil and adenoidectomy (1995); hx breast augmentation (2015); hx tonsillectomy; and hx adenoidectomy.     Social History/Living Environment:     Lives with  and kids  Prior Level of Function/Work/Activity:  Fully functioning prior to March 2019  Dominant Side:         LEFT                    Current Medications:       Current Outpatient Medications:     LO LOESTRIN FE 1 mg-10 mcg (24)/10 mcg (2) tab, TAKE ONE TABLET BY MOUTH ONE TIME DAILY, Disp: 3 Package, Rfl: 3    SUMAtriptan (IMITREX) 100 mg tablet, TAKE 1 TABLET BY MOUTH ONCE AS NEEDED FOR MIGRAINE FOR UP TO 1 DOSE, Disp: 10 Tab, Rfl: 0    cyclobenzaprine (FLEXERIL) 10 mg tablet, Take 1 tablet as needed once a day for tension headache, Disp: 30 Tab, Rfl: 0    OTHER, Melaleuca Vitamin Sukhi, Disp: , Rfl:     pseudoephedrine (SUDAFED) 30 mg tablet, Take  by mouth every four (4) hours as needed for Congestion. , Disp: , Rfl:     multivitamin (ONE A DAY) tablet, Take 1 Tab by mouth daily. , Disp: , Rfl:     b complex vitamins tablet, Take 1 Tab by mouth daily. , Disp: , Rfl:     CHOLECALCIFEROL, VITAMIN D3, SL, by SubLINGual route., Disp: , Rfl:     prasterone, dhea, (DHEA) 25 mg cap, Take  by mouth., Disp: , Rfl:     Omega-3 Fatty Acids (FISH OIL) 300 mg cap, Take  by mouth., Disp: , Rfl:    Date Last Reviewed:  1/8/2020   Number of Personal Factors/Comorbidities that affect the Plan of Care: 0: LOW COMPLEXITY   EXAMINATION:   OBSERVATION/POSTURE: Rounded shoulders. PALPATION: TTP along L proximal biceps tendon, anterior deltoid, upper trap, posterior deltoid, infraspinatus, teres major/minor and lats    ROM: measured in degrees      RIGHT LEFT   Flexion 158/180 110 actively, 125 passively    Abduction 150 90 painful   External Rotation 80/107 55 passively painful   Internal Rotation 75 30     STRENGTH: ER 3+/5    SPECIAL TESTS: not assessed at this time     NEUROLOGICAL SCREEN: normal    FUNCTIONAL MOBILITY      RIGHT LEFT   Apley ER T3 Skull - painful   Apley IR T3 L buttock - painful   Horizontal adduction Posterior shoulder nt     FLEXIBILITY: decreased mobility of upper traps and pecs    JOINT MOBILITY: decreased glenohumeral joint mobility in posterior and inferior direction    HK (Gonzalez-Kentrell); ER (external rotation); IR (internal rotation); HA (horizontal adduction); HAB (horizontal abduction); GH (glenohumeral); AC (acromioclavicular); TTP (tenderness to palpation); UT (upper trapezius);  * (painful); nt (not tested); WFL (within functional limits)     Body Structures Involved:  1. Nerves  2. Bones  3. Joints  4. Muscles  5. Ligaments Body Functions Affected:  1. Sensory/Pain  2. Neuromusculoskeletal  3. Movement Related Activities and Participation Affected:  1. General Tasks and Demands  2. Mobility  3. Interpersonal Interactions and Relationships  4.  Community, Social and [place]   Number of elements (examined above) that affect the Plan of Care: 4+: HIGH COMPLEXITY   CLINICAL PRESENTATION:   Presentation: Stable and uncomplicated: LOW COMPLEXITY   CLINICAL DECISION MAKING:   Use of outcome tool(s) and clinical judgement create a POC that gives a: Clear prediction of patient's progress: LOW COMPLEXITY

## 2020-01-09 ENCOUNTER — HOSPITAL ENCOUNTER (OUTPATIENT)
Dept: PHYSICAL THERAPY | Age: 50
Discharge: HOME | End: 2020-01-09
Payer: OTHER GOVERNMENT

## 2020-01-09 PROCEDURE — 97140 MANUAL THERAPY 1/> REGIONS: CPT

## 2020-01-09 PROCEDURE — 97110 THERAPEUTIC EXERCISES: CPT

## 2020-01-09 NOTE — PROGRESS NOTES
Nic Barboza  : 1970  Primary: [facility]  Secondary:  Therapy Center at [facility] [address], [address] [facility]  [phone]   [phone]      OUTPATIENT PHYSICAL THERAPY: Daily Treatment Note 2020  Visit Count:  19    ICD-10: Treatment Diagnosis: Left shoulder pain (M25.512); Muscle weakness, generalized (M62.81)  Precautions/Allergies:   Patient has no known allergies. TREATMENT PLAN:  Effective Dates: 2020 TO 2020 (90 days). Frequency/Duration: 2 times a week for 90 Day(s)    Pre-treatment Symptoms/Complaints: Feeling a little better from yesterday, worked a lot at home last night  Pain: Initial:  /10 Post Session:  2/10   Medications Last Reviewed:  2020  Updated Objective Findings:  None Today  TREATMENT:     Therapeutic Exercise: (20 minutes):  Exercises per grid below to improve mobility, strength and coordination. Required minimal visual, verbal and manual cues to promote proper body alignment and promote proper body mechanics. Progressed resistance, range, repetitions and complexity of movement as indicated. Date:  2020   Activity/Exercise Parameters   Foam roll - perpendicular --   Supine punch with band - 2# 3x15   Standing TRX flexion stretch --   VINAY 90-90 lift with arms up at shoulder height --   Standing ER and IR walkouts x30-40 each   Bent over rows and extensions -5 and1# 3x15   Standing rows - red band --   Flexion in incline position with band 2xfatigue   Self mobilization with 8.8# - inferior glide seated 4 minutes   D1/D2 PNF patterns - supine --   Sidelying ER --   Rhythmic stabilizations 3 minutes   UBE 4 minutes   ER stretch --     Manual Therapy ( 30 minutes  ): Manual techniques to facilitate improved motion and decreased pain.  (Used abbreviations: MET - muscle energy technique; PNF - proprioceptive neuromuscular facilitation; NMR - neuromuscular re-education; a/p - anterior to posterior; p/a - posterior to anterior)   · Glenohumeral joint mobilizations - posterior/inferior direction  · Shoulder PROM  · With written consent received and precautions reviewed, instrument-assisted soft tissue mobilization was performed to L posterior shoulder for the purpose of improving blood flow and soft tissue mobility with a positive treatment effect and no complications noted. · Soft tissue mobilizations to subclavius (not performed)  · Joint mobilization: prone cervicothoracic junction manipulation (NOT PERFORMED)  · Joint mobilization: central p/a to thoracic spine grade III  · L cervical opening glides; 1st rib mobilizations    Modalities: ice pack    Treatment/Session Summary:    · Response to Treatment:  Josefina tolerated treatment well today. Able to perform active flexion exercises better with posterior cuff activation. More tolerable to manual work today  · Communication/Consultation:  None today  · Equipment provided today:  None today  · Recommendations/Intent for next treatment session: Next visit will focus on soft tissue mobility, P/AROM, rotator cuff activation/strengthening, VINAY techniques for rib cage positioning.     Total Treatment Billable Duration:  20 minutes therex, 30 minutes manual     Alessio Moy    Future Appointments   Date Time Provider Romi Carter   1/15/2020  9:30 AM Jaylen Rios [facility]   1/16/2020 11:30 AM Jaylen Hint [facility]   1/21/2020 11:30 AM Jaylen Hint [facility]   1/22/2020  9:30 AM Jaylen Hint [facility]   1/27/2020  9:30 AM Jaylen Hint [facility]   1/28/2020 11:30 AM Jaylen Hint [facility]   5/4/2020  9:15 AM [facility] ROOM 1 [facility]   9/30/2020  8:45 AM Catalina Ackerman MD [facility] [address] [facility]

## 2020-01-15 ENCOUNTER — HOSPITAL ENCOUNTER (OUTPATIENT)
Dept: PHYSICAL THERAPY | Age: 50
Discharge: HOME | End: 2020-01-15
Payer: OTHER GOVERNMENT

## 2020-01-15 PROCEDURE — 97140 MANUAL THERAPY 1/> REGIONS: CPT

## 2020-01-15 PROCEDURE — 97110 THERAPEUTIC EXERCISES: CPT

## 2020-01-15 NOTE — PROGRESS NOTES
Phoenix Knight  : 1970  Primary: [facility]  Secondary:  Therapy Center at [place] [address], [address] [facility]  [phone]   [phone]      OUTPATIENT PHYSICAL THERAPY: Daily Treatment Note 1/15/2020  Visit Count:  20    ICD-10: Treatment Diagnosis: Left shoulder pain (M25.512); Muscle weakness, generalized (M62.81)  Precautions/Allergies:   Patient has no known allergies. TREATMENT PLAN:  Effective Dates: 2020 TO 2020 (90 days). Frequency/Duration: 2 times a week for 90 Day(s)    Pre-treatment Symptoms/Complaints: Has been feeling better overall but tight this morning. [facility] will follow up with MD in about 10 days. Pain: Initial: Pain Intensity 1: 2/10 Post Session:  2/10   Medications Last Reviewed:  1/15/2020  Updated Objective Findings:  None Today  TREATMENT:     Therapeutic Exercise: (20 minutes):  Exercises per grid below to improve mobility, strength and coordination. Required minimal visual, verbal and manual cues to promote proper body alignment and promote proper body mechanics. Progressed resistance, range, repetitions and complexity of movement as indicated. Date:  1/15/2020   Activity/Exercise Parameters   Foam roll - perpendicular --   Supine punch with band - 2# --   Standing TRX flexion stretch --   Standing ER at 90 degrees abduction - orange band 2x15   Standing IR walkouts x30-40    Bent over rows - 5# 3x15   Standing rows - red band 2x20   Flexion slide up wall with lift off x15   Self mobilization with 8.8# - inferior glide seated 4 minutes   D1/D2 PNF patterns - supine --   Sidelying ER --   Rhythmic stabilizations 3 minutes   UBE 4 minutes   Prone I, T 2x10 each     Manual Therapy (  Soft Tissue Mobilization Duration  Duration: 30 Minutes): Manual techniques to facilitate improved motion and decreased pain.  (Used abbreviations: MET - muscle energy technique; PNF - proprioceptive neuromuscular facilitation; NMR - neuromuscular re-education; a/p - anterior to posterior; p/a - posterior to anterior)   · Glenohumeral joint mobilizations - posterior/inferior direction  · Shoulder PROM  · With written consent received and precautions reviewed, instrument-assisted soft tissue mobilization was performed to L posterior shoulder for the purpose of improving blood flow and soft tissue mobility with a positive treatment effect and no complications noted. · Soft tissue mobilizations to subclavius  · Joint mobilization: central p/a to thoracic spine grade III  · L cervical opening glides; 1st rib mobilizations    Modalities: ice pack    Treatment/Session Summary:    · Response to Treatment:  Josefina felt more loose in L upper trap and pec region today. Had a hard time with prone T's from decreased AROM. Needs continued focus on posterior cuff strength to help with normal shoulder mechanics.   · Communication/Consultation:  None today  · Equipment provided today:  None today  · Recommendations/Intent for next treatment session: Next visit will focus on soft tissue mobility, P/AROM, rotator cuff activation/strengthening    Total Treatment Billable Duration:  20 minutes therex, 30 minutes manual  PT Patient Time In/Time Out  Time In: 0930  Time Out: 1035  Corinne Maria    Future Appointments   Date Time Provider Romi Carter   1/16/2020 11:30 AM Pito Starring [facility]   1/21/2020 11:30 AM Pito Starring [facility]   1/22/2020  9:30 AM Pito Starring [facility]   1/27/2020  9:30 AM Pito Starring [facility]   1/28/2020 11:30 AM Pito Starring [facility]   5/4/2020  9:15 AM [facility] ROOM 1 [facility]   9/30/2020  8:45 AM Carol Negro MD  [facility]

## 2020-01-16 ENCOUNTER — HOSPITAL ENCOUNTER (OUTPATIENT)
Dept: PHYSICAL THERAPY | Age: 50
Discharge: HOME | End: 2020-01-16
Payer: OTHER GOVERNMENT

## 2020-01-16 PROCEDURE — 97110 THERAPEUTIC EXERCISES: CPT

## 2020-01-16 PROCEDURE — 97140 MANUAL THERAPY 1/> REGIONS: CPT

## 2020-01-16 NOTE — PROGRESS NOTES
Christiano Mrash  : 1970  Primary: [facility]  Secondary:  Therapy Center at [place] [address], [address] [phone]   [phone]      OUTPATIENT PHYSICAL THERAPY: Daily Treatment Note 2020  Visit Count:  21    ICD-10: Treatment Diagnosis: Left shoulder pain (M25.512); Muscle weakness, generalized (M62.81)  Precautions/Allergies:   Patient has no known allergies. TREATMENT PLAN:  Effective Dates: 2020 TO 2020 (90 days). Frequency/Duration: 2 times a week for 90 Day(s)    Pre-treatment Symptoms/Complaints: Was sore after yesterday's session but feeling better this morning  Pain: Initial: Pain Intensity 1: 2/10 Post Session:  2/10   Medications Last Reviewed:  2020  Updated Objective Findings:  Active flexion 120 degrees with trunk extension compensation  TREATMENT:     Therapeutic Exercise: (20 minutes):  Exercises per grid below to improve mobility, strength and coordination. Required minimal visual, verbal and manual cues to promote proper body alignment and promote proper body mechanics. Progressed resistance, range, repetitions and complexity of movement as indicated. Date:  2020   Activity/Exercise Parameters   Foam roll - perpendicular --   Supine punch with band - 2# --   Standing TRX flexion stretch --   Standing ER at 90 degrees abduction - orange band --   Quadruped flexion x10    Seated roll outs against resistance with lift off 2x10   Standing rows - red band x20   Flexion with blue band - inclined 2x10   Self mobilization with 8.8# - inferior glide seated --   D1/D2 PNF patterns - supine --   Sidelying ER - 2# 2xfatigue   Rhythmic stabilizations --   UBE 4 minutes   Prone I, T --     Manual Therapy (  Soft Tissue Mobilization Duration  Duration: 25 Minutes): Manual techniques to facilitate improved motion and decreased pain.  (Used abbreviations: MET - muscle energy technique; PNF - proprioceptive neuromuscular facilitation; NMR - neuromuscular re-education; a/p - anterior to posterior; p/a - posterior to anterior)   · Glenohumeral joint mobilizations - posterior/inferior direction  · Shoulder PROM  · Soft tissue mobilizations to L upper trap, medial scapula and posterior shoulder  · Soft tissue mobilizations to subclavius (not performed)  · Joint mobilization: central p/a to thoracic spine grade III  · L cervical opening glides; 1st rib mobilizations (not performed)    Modalities: none    Treatment/Session Summary:    · Response to Treatment:  Josefina felt better by end of session with less stiffness in shoulder. Focused more on posterior cuff activation with flexion based movements.  She is able to perform sidelying ER easier  · Communication/Consultation:  None today  · Equipment provided today:  None today  · Recommendations/Intent for next treatment session: Next visit will focus on soft tissue mobility, P/AROM, rotator cuff activation/strengthening    Total Treatment Billable Duration:  20 minutes therex, 25 minutes manual  PT Patient Time In/Time Out  Time In: 1130  Time Out: 1220  Corinne Maria    Future Appointments   Date Time Provider Romi Carter   1/21/2020 11:30 AM Pito Starring [facility]   1/22/2020  9:30 AM Pito Starring [facility]   1/27/2020  9:30 AM Pito Starring [facility]   1/28/2020 11:30 AM Pito Starring [facility]   5/4/2020  9:15 AM [facility] ROOM 1 [facility]   9/30/2020  8:45 AM Carol Negro MD [facility] [address] [facility]

## 2020-01-21 ENCOUNTER — HOSPITAL ENCOUNTER (OUTPATIENT)
Dept: PHYSICAL THERAPY | Age: 50
Discharge: HOME | End: 2020-01-21
Payer: OTHER GOVERNMENT

## 2020-01-21 NOTE — PROGRESS NOTES
Lennox Duck  : 1970  Primary: [facility]  Secondary:  [address]  at [address], [address] [place] [address]  [phone]   [phone]          DATE: 2020    Patient canceled her appointment today due to having to take mother to MD appt. Will plan to follow up on next scheduled visit.       Rebecca Hernandez, PT, DPT, OCS

## 2020-01-22 ENCOUNTER — HOSPITAL ENCOUNTER (OUTPATIENT)
Dept: PHYSICAL THERAPY | Age: 50
Discharge: HOME | End: 2020-01-22
Payer: OTHER GOVERNMENT

## 2020-01-22 PROCEDURE — 97110 THERAPEUTIC EXERCISES: CPT

## 2020-01-22 PROCEDURE — 97140 MANUAL THERAPY 1/> REGIONS: CPT

## 2020-01-22 NOTE — PROGRESS NOTES
Gregory Salvador  : 1970  Primary: [facility]  Secondary:  Therapy Center at [place] [address], [address] [facility]  [phone]   [phone]      OUTPATIENT PHYSICAL THERAPY: Daily Treatment Note 2020  Visit Count:  22    ICD-10: Treatment Diagnosis: Left shoulder pain (M25.512); Muscle weakness, generalized (M62.81)  Precautions/Allergies:   Patient has no known allergies. TREATMENT PLAN:  Effective Dates: 2020 TO 2020 (90 days). Frequency/Duration: 2 times a week for 90 Day(s)    Pre-treatment Symptoms/Complaints: Was really sore in shoulder 2 days ago, not sure why, maybe weather change. Pain: Initial: Pain Intensity 1: 3/10 Post Session:  2/10   Medications Last Reviewed:  2020  Updated Objective Findings:  None Today  TREATMENT:     Therapeutic Exercise: (20 minutes):  Exercises per grid below to improve mobility, strength and coordination. Required minimal visual, verbal and manual cues to promote proper body alignment and promote proper body mechanics. Progressed resistance, range, repetitions and complexity of movement as indicated. Date:  2020   Activity/Exercise Parameters   Foam roll - perpendicular --   Supine punch with band - 3# 3x15   Standing TRX flexion stretch --   Standing ER - orange band 3x15   Quadruped flexion --    Seated roll outs against resistance with lift off - blue band 2x10   Standing rows - red band 2x20   Flexion with blue band - inclined 2x10   Self mobilization with 8.8# - inferior glide seated 4 minutes   D1/D2 PNF patterns - supine --   Sidelying ER - resistance from PT 2xfatigue   Rhythmic stabilizations --   UBE 4 minutes   Endurance hold at 90 degrees - 3# 2 laps     Manual Therapy (  Soft Tissue Mobilization Duration  Duration: 35 Minutes): Manual techniques to facilitate improved motion and decreased pain.  (Used abbreviations: MET - muscle energy technique; PNF - proprioceptive neuromuscular facilitation; NMR - neuromuscular re-education; a/p - anterior to posterior; p/a - posterior to anterior)   · Glenohumeral joint mobilizations - posterior/inferior direction  · Shoulder PROM  · Soft tissue mobilizations to L upper trap, medial scapula, lats and posterior shoulder  · Soft tissue mobilizations to subclavius (not performed)  · Joint mobilization: central p/a to thoracic spine grade III  · L cervical opening glides; 1st rib mobilizations      Modalities: ice pack x5 minutes    Treatment/Session Summary:    · Response to Treatment:  Mariela Lee continues to exhibit a decrease in glenohumeral joint mobility with humeral elevation during abduction movements. Abnormal shoulder mechanics cause impingment type symptoms with arm overhead. She also still has some weakness in her posterior cuff.   · Communication/Consultation:  None today  · Equipment provided today:  None today  · Recommendations/Intent for next treatment session: Next visit will focus on soft tissue mobility, P/AROM, rotator cuff activation/strengthening    Total Treatment Billable Duration:  20 minutes therex, 35 minutes manual  PT Patient Time In/Time Out  Time In: 0930  Time Out: 1040  Guera Jansen    Future Appointments   Date Time Provider Romi Carter   1/27/2020  9:30 AM Harrison [facility]   1/28/2020 11:30 AM Harrison [facility]   5/4/2020  9:15 AM [facility] ROOM 1 [facility]   9/30/2020  8:45 AM Chrissie Oliva MD [facility] [address] [facility]

## 2020-01-27 ENCOUNTER — HOSPITAL ENCOUNTER (OUTPATIENT)
Dept: PHYSICAL THERAPY | Age: 50
Discharge: HOME | End: 2020-01-27
Payer: OTHER GOVERNMENT

## 2020-01-27 PROCEDURE — 97140 MANUAL THERAPY 1/> REGIONS: CPT

## 2020-01-27 PROCEDURE — 97110 THERAPEUTIC EXERCISES: CPT

## 2020-01-27 NOTE — PROGRESS NOTES
Anson Almita  : 1970  Primary: [facility]  Secondary:  [facility] [place] [address], [address] [facility]  [phone]   [phone]      OUTPATIENT PHYSICAL THERAPY: Daily Treatment Note 2020  Visit Count:  23    ICD-10: Treatment Diagnosis: Left shoulder pain (M25.512); Muscle weakness, generalized (M62.81)  Precautions/Allergies:   Patient has no known allergies. TREATMENT PLAN:  Effective Dates: 2020 TO 2020 (90 days). Frequency/Duration: 2 times a week for 90 Day(s)    Pre-treatment Symptoms/Complaints: Shoulder is stiff rather than painful. Every once in a while she will tweak the shoulder and cause it to hurt, but mainly stiff. She will follow up with Dr. Spring Manzano next week  Pain: Initial: Pain Intensity 1: 4/10 Post Session:  2/10   Medications Last Reviewed:  2020  Updated Objective Findings:  Active flexion 110 degrees, ABD 95 degrees  TREATMENT:     Therapeutic Exercise: (20 minutes):  Exercises per grid below to improve mobility, strength and coordination. Required minimal visual, verbal and manual cues to promote proper body alignment and promote proper body mechanics. Progressed resistance, range, repetitions and complexity of movement as indicated.    Date:  2020   Activity/Exercise Parameters   Foam roll - perpendicular and parallel 4 minutes   Supine punch with band - 3# --   Standing TRX flexion stretch --   Standing ER - orange band 3x15   Quadruped flexion x10-15    Seated roll outs against resistance with lift off - blue band --   Standing rows - red band 2x20   Flexion slides up wall in pillow case x10   Self mobilization with 8.8# - inferior glide seated --   D1/D2 PNF patterns - supine --   Sidelying ER - resistance from PT --   Rhythmic stabilizations --   UBE 4 minutes   Clock taps x10   Shoulder hori abd - orange ball 2x15         Manual Therapy (  Soft Tissue Mobilization Duration  Duration: 35 Minutes): Manual techniques to facilitate improved motion and decreased pain. (Used abbreviations: MET - muscle energy technique; PNF - proprioceptive neuromuscular facilitation; NMR - neuromuscular re-education; a/p - anterior to posterior; p/a - posterior to anterior)   · Glenohumeral joint mobilizations - posterior/inferior direction  · Shoulder PROM  · Soft tissue mobilizations to L upper trap, medial scapula, lats and posterior shoulder  · Soft tissue mobilizations to subclavius (not performed)  · Joint mobilization: central p/a to thoracic spine grade III  · L cervical opening glides; 1st rib mobilizations      Modalities: none    Treatment/Session Summary:    · Response to Treatment:  Josefina's P/AROM has seemed to have plateaued. Decreased glenohumeral joint mobility and poor shoulder kinematics due to posterior cuff weakness make it difficult for her to lift overhead functionally. She remains in the frozen stage of adhesive capsulitis. She will benefit from following up with her MD to determine need for futher treatment. · Communication/Consultation:  None today  · Equipment provided today:  None today  · Recommendations/Intent for next treatment session: Next visit will focus on soft tissue mobility, P/AROM, rotator cuff activation/strengthening.  Patient will follow up with MD next week    Total Treatment Billable Duration:  20 minutes therex, 35 minutes manual  PT Patient Time In/Time Out  Time In: 0930  Time Out: 1030  Xavier Fleming    Future Appointments   Date Time Provider Romi Carter   2/5/2020 10:30 AM Colby Cespedes [facility]   5/4/2020  9:15 AM [facility] ROOM 1 [facility]   9/30/2020  8:45 AM Micky Reilly MD [address]

## 2020-01-28 ENCOUNTER — APPOINTMENT (OUTPATIENT)
Dept: PHYSICAL THERAPY | Age: 50
End: 2020-01-28
Payer: OTHER GOVERNMENT

## 2020-02-05 ENCOUNTER — HOSPITAL ENCOUNTER (OUTPATIENT)
Dept: PHYSICAL THERAPY | Age: 50
Discharge: HOME | End: 2020-02-05
Payer: OTHER GOVERNMENT

## 2020-02-05 PROCEDURE — 97140 MANUAL THERAPY 1/> REGIONS: CPT

## 2020-02-05 PROCEDURE — 97110 THERAPEUTIC EXERCISES: CPT

## 2020-02-05 NOTE — PROGRESS NOTES
Casi Cisneros  : 1970  Primary: [facility]  Secondary:  [facility] [place] [address], [address] [facility]  [phone]   [phone]      OUTPATIENT PHYSICAL THERAPY: Daily Treatment Note 2020  Visit Count:  24    ICD-10: Treatment Diagnosis: Left shoulder pain (M25.512); Muscle weakness, generalized (M62.81)  Precautions/Allergies:   Patient has no known allergies. TREATMENT PLAN:  Effective Dates: 2020 TO 2020 (90 days). Frequency/Duration: 2 times a week for 90 Day(s)    Pre-treatment Symptoms/Complaints: Went to [facility] on Monday which really helped. Followed up with surgeon yesterday who gave her a cortisone injection and wants to see her back in 2 weeks to determine if an arthroscope is necessary  Pain: Initial: Pain Intensity 1: 2/10 Post Session:  2/10   Medications Last Reviewed:  2020  Updated Objective Findings:  Active flexion 110 degrees, ABD 95 degrees  TREATMENT:     Therapeutic Exercise: (30 minutes):  Exercises per grid below to improve mobility, strength and coordination. Required minimal visual, verbal and manual cues to promote proper body alignment and promote proper body mechanics. Progressed resistance, range, repetitions and complexity of movement as indicated.    Date:  2020   Activity/Exercise Parameters   Foam roll - perpendicular and parallel --   Supine punch with band - 3# --   Standing TRX flexion stretch --   Standing ER and IR - orange band 3x15   Table push ups 2x15    Seated roll outs against resistance with lift off - blue band --   Standing rows - red band 2x20   Flexion slides up wall in pillow case --   Self mobilization with 8.8# - inferior glide seated --   Shoulder horizontal abduction - orange band 2x15   Table shoulder taps 2x15   Rhythmic stabilizations --   UBE 4 minutes   Clock taps --   [place] carry - 22# KB 2x2 laps     Manual Therapy (  Soft Tissue Mobilization Duration  Duration: 20 Minutes): Manual techniques to facilitate improved motion and decreased pain. (Used abbreviations: MET - muscle energy technique; PNF - proprioceptive neuromuscular facilitation; NMR - neuromuscular re-education; a/p - anterior to posterior; p/a - posterior to anterior)   · Glenohumeral joint mobilizations - posterior/inferior direction  · Shoulder PROM  · Soft tissue mobilizations to L upper trap, medial scapula, lats and posterior shoulder  · Soft tissue mobilizations to subclavius (not performed)  · Joint mobilization: central p/a to thoracic spine grade III  · L cervical opening glides; 1st rib mobilizations (not performed)    Treatment/Session Summary:    · Response to Treatment:  Josefina exhibited improved passive shoulder abduction in supine but active flexion still limited with humeral rising and thoracic extension for compensation. She did well with addition of table push ups. · Communication/Consultation:  None today  · Equipment provided today:  None today  · Recommendations/Intent for next treatment session: Next visit will focus on soft tissue mobility, P/AROM, rotator cuff activation/strengthening.      Total Treatment Billable Duration:  30 minutes therex, 20 minutes manual  PT Patient Time In/Time Out  Time In: 1030  Time Out: 1130  Keila Mayorga    Future Appointments   Date Time Provider Romi Carter   2/12/2020  8:30 [facility]   2/20/2020  8:30 AM [facility]   5/4/2020  9:15 AM [facility] ROOM 1 [facility]   9/30/2020  8:45 AM Yadira Estrada MD [address]

## 2020-02-12 ENCOUNTER — HOSPITAL ENCOUNTER (OUTPATIENT)
Dept: PHYSICAL THERAPY | Age: 50
Discharge: HOME | End: 2020-02-12
Payer: OTHER GOVERNMENT

## 2020-02-12 NOTE — PROGRESS NOTES
Fercho Sanchez  : 1970  Primary: [facility]  Secondary:  [facility] [address]  [phone]   [phone]          DATE: 2020    Patient canceled her appointment today due to illness. Will plan to follow up on next scheduled visit.       Tish Flores, PT, DPT, OCS

## 2020-02-20 ENCOUNTER — APPOINTMENT (OUTPATIENT)
Dept: PHYSICAL THERAPY | Age: 50
End: 2020-02-20
Payer: OTHER GOVERNMENT

## 2020-07-13 NOTE — THERAPY DISCHARGE
Emily Heard  : 1970  Primary: [facility]  Secondary:  [facility] [place] [id number] [address], [address]  [phone]   [phone]        OUTPATIENT PHYSICAL THERAPY:Discontinuation Summary 2020     ICD-10: Treatment Diagnosis: Left shoulder pain (M25.512); Muscle weakness, generalized (M62.81)  Precautions/Allergies:   Patient has no known allergies. TREATMENT PLAN:  Effective Dates: 2020 TO 2020 (90 days). Frequency/Duration: 2 times a week for 90 Day(s) MEDICAL/REFERRING DIAGNOSIS:  Pain in left shoulder [M25.512]   DATE OF ONSET: 10/7/19  REFERRING PHYSICIAN: Kimberly Pino MD MD Orders: [employer] [facility]  Return MD Appointment: TBD     DISCONTINUATION SUMMARY (20): Juliette Mckeon has not returned to therapy since 20 so she will be discharged at this time. GOALS: (Goals have been discussed and agreed upon with patient.)  Discharge Goals: Time Frame: 19 - 1/3/20  1. Patient will be independent with home exercise program without assistance from therapist. NOT REASSESSED  2. Patient will report 15 Quick DASH score to demonstrate improved functional capacity. NOT REASSESSED  3. 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 NOT REASSESSED  4. Patient will demonstrate L passive ER to 80 degrees to demonstrate improved functional mobility. NOT REASSESSED  5. Patient will demonstrate 4+/5 L ER and scaption strength to allow her to return to return to light UE workouts in the gym. NOT REASSESSED      OUTCOME MEASURE:   Tool Used: Disabilities of the Arm, Shoulder and Hand (DASH) Questionnaire - Quick Version  Score:  Initial: 27/55 (19)  28/55 (Date: 19 ) 30/55 (20)   Interpretation of Score:  The DASH is designed to measure the activities of daily living in person's with upper extremity dysfunction or pain. Each section is scored on a 1-5 scale, 5 representing the greatest disability. The scores of each section are added together for a total score of 55. Regarding Sonia Stark's therapy, I certify that the treatment plan above will be carried out by a therapist or under their direction. Thank you for this referral,  Marleny Mujica     Referring Physician Signature: Renetta Fuentes MD                 PAIN/SUBJECTIVE:   Initial: Pain Intensity 1: 2/10 Post Session:  5/10   HISTORY:   History of Injury/Illness (Reason for Referral):  Fantasma Flores presents to physical therapy today with complaints of L shoulder pain secondary to arthroscopic surgery on 10/7/19. She was having shoulder issues since March with no particular SELENE. She had 2 injections and a round of PT to try and prevent surgery but she continued to have pain early in the morning and at night when trying to sleep. She underwent a subacromial decompression with no involvement of the labrum, rotator cuff or biceps tendon according to patient. She reports she was doing well until this past Friday she did light bicep curls and had her  massage her shoulder and it seemed to increase her pain in biceps region. No reports of numbness/tingling in L UE. She currently uses heat and motrin prn. She wants to return to working out with her  at the gym and hiking with her family. Past Medical History/Comorbidities:   Ms. Bijal Gan  has a past medical history of Migraine. She also has no past medical history of Adverse effect of anesthesia, Difficult intubation, or Nausea & vomiting. Ms. Bijal Gan  has a past surgical history that includes implant breast silicone/eq; hx tonsil and adenoidectomy (1995); hx breast augmentation (2015); hx tonsillectomy; and hx adenoidectomy.     Social History/Living Environment:     Lives with  and kids  Prior Level of Function/Work/Activity:  Fully functioning prior to March 2019  Dominant Side:         LEFT                    Current Medications:       Current Outpatient Medications:     SUMAtriptan (IMITREX) 100 mg tablet, TAKE 1 TABLET BY MOUTH ONCE AS NEEDED FOR MIGRAINES FOR UP TO 1 DOSE, Disp: 10 Tab, Rfl: 0    cyclobenzaprine (FLEXERIL) 10 mg tablet, Take 1 tablet as needed once a day for tension headache, Disp: 30 Tab, Rfl: 0    montelukast (SINGULAIR) 10 mg tablet, Take 1 Tab by mouth daily. , Disp: 30 Tab, Rfl: 5    LO LOESTRIN FE 1 mg-10 mcg (24)/10 mcg (2) tab, TAKE ONE TABLET BY MOUTH ONE TIME DAILY, Disp: 3 Package, Rfl: 3    OTHER, Melaleuca Vitamin Sukhi, Disp: , Rfl:     multivitamin (ONE A DAY) tablet, Take 1 Tab by mouth daily. , Disp: , Rfl:     b complex vitamins tablet, Take 1 Tab by mouth daily. , Disp: , Rfl:     CHOLECALCIFEROL, VITAMIN D3, SL, by SubLINGual route., Disp: , Rfl:     prasterone, dhea, (DHEA) 25 mg cap, Take  by mouth., Disp: , Rfl:     Omega-3 Fatty Acids (FISH OIL) 300 mg cap, Take  by mouth., Disp: , Rfl:    Date Last Reviewed:  7/13/2020   Number of Personal Factors/Comorbidities that affect the Plan of Care: 0: LOW COMPLEXITY   EXAMINATION:

## 2022-04-27 ENCOUNTER — TRANSCRIBE ORDER (OUTPATIENT)
Dept: SCHEDULING | Age: 52
End: 2022-04-27

## 2022-04-27 DIAGNOSIS — Z13.820 ENCOUNTER FOR SCREENING FOR OSTEOPOROSIS: ICD-10-CM

## 2022-04-27 DIAGNOSIS — Z12.31 ENCOUNTER FOR SCREENING MAMMOGRAM FOR MALIGNANT NEOPLASM OF BREAST: Primary | ICD-10-CM

## 2022-06-01 ENCOUNTER — OFFICE VISIT (OUTPATIENT)
Dept: FAMILY MEDICINE CLINIC | Facility: CLINIC | Age: 52
End: 2022-06-01
Payer: OTHER GOVERNMENT

## 2022-06-01 VITALS
WEIGHT: 160 LBS | DIASTOLIC BLOOD PRESSURE: 72 MMHG | HEART RATE: 79 BPM | SYSTOLIC BLOOD PRESSURE: 110 MMHG | BODY MASS INDEX: 25.06 KG/M2 | OXYGEN SATURATION: 96 %

## 2022-06-01 DIAGNOSIS — B02.9 HERPES ZOSTER WITHOUT COMPLICATION: Primary | ICD-10-CM

## 2022-06-01 PROCEDURE — 99214 OFFICE O/P EST MOD 30 MIN: CPT | Performed by: NURSE PRACTITIONER

## 2022-06-01 RX ORDER — VALACYCLOVIR HYDROCHLORIDE 1 G/1
1000 TABLET, FILM COATED ORAL 3 TIMES DAILY
Qty: 30 TABLET | Refills: 0 | Status: SHIPPED | OUTPATIENT
Start: 2022-06-01 | End: 2022-06-11

## 2022-06-01 RX ORDER — IBUPROFEN 800 MG/1
800 TABLET ORAL EVERY 8 HOURS PRN
Qty: 10 TABLET | Refills: 0 | Status: SHIPPED | OUTPATIENT
Start: 2022-06-01

## 2022-06-01 RX ORDER — GABAPENTIN 300 MG/1
300 CAPSULE ORAL 2 TIMES DAILY PRN
Qty: 30 CAPSULE | Refills: 0 | Status: SHIPPED | OUTPATIENT
Start: 2022-06-01 | End: 2022-06-16

## 2022-06-01 ASSESSMENT — ENCOUNTER SYMPTOMS
ANAL BLEEDING: 0
GASTROINTESTINAL NEGATIVE: 1
SINUS PRESSURE: 0
EYE PAIN: 0
NAUSEA: 0
EYE DISCHARGE: 0
APNEA: 0
WHEEZING: 0
CHOKING: 0
CONSTIPATION: 0
BACK PAIN: 0
FACIAL SWELLING: 0
EYES NEGATIVE: 1
VOICE CHANGE: 0
ABDOMINAL DISTENTION: 0
SHORTNESS OF BREATH: 0
BLOOD IN STOOL: 0
COLOR CHANGE: 0
ALLERGIC/IMMUNOLOGIC NEGATIVE: 1
SORE THROAT: 0
STRIDOR: 0
EYE ITCHING: 0
PHOTOPHOBIA: 0
SINUS PAIN: 0
RECTAL PAIN: 0
COUGH: 0
ABDOMINAL PAIN: 0
DIARRHEA: 0
CHEST TIGHTNESS: 0
RESPIRATORY NEGATIVE: 1
VOMITING: 0
EYE REDNESS: 0
TROUBLE SWALLOWING: 0
RHINORRHEA: 0

## 2022-06-01 NOTE — PROGRESS NOTES
PROGRESS NOTE    Chief Complaint   Patient presents with    Herpes Zoster     patient presenting with possible shingles. located on left leg mainly on left inner thigh. patient denies history of shingles       SUBJECTIVE:     Jose Sethi is a very pleasant 45 y.o. female with hx of migraines and hypercholesterolemia, seen today in office with complaints of rash development 3 days ago to her left upper inner thigh. Patient reports noticing a vesicular erythematous rash development that accompanies with itching and deep pain to rash and left upper thigh. Patient reports no recent exposure to plants or insects. She does have a history of chickenpox. Pertinent negatives include no fever or chills, no chest pain, no shortness of breath, no palpitation, no unilateral or focal weakness, no recent exposure to plants, poison ivy or oak or insect bites, no abdominal pain, no nausea, no vomiting, no diarrhea. Past Medical History, Past Surgical History, Family history, Social History, and Medications were all reviewed with the patient today and updated as necessary. Current Outpatient Medications   Medication Sig Dispense Refill    B COMPLEX VITAMINS PO Take 1 tablet by mouth daily      VITAMIN D, CHOLECALCIFEROL, PO Place under the tongue      Multiple Vitamin (MULTIVITAMIN ADULT PO) Take 1 tablet by mouth daily      Omega-3 Fatty Acids (FISH OIL PO) Take by mouth      UNABLE TO FIND Melaleuca Vitamin Jovan      valACYclovir (VALTREX) 1 g tablet Take 1 tablet by mouth 3 times daily for 10 days 30 tablet 0    gabapentin (NEURONTIN) 300 MG capsule Take 1 capsule by mouth 2 times daily as needed (pain) for up to 15 days. With food.  May cause drowsiness/sleepiness 30 capsule 0    ibuprofen (ADVIL;MOTRIN) 800 MG tablet Take 1 tablet by mouth every 8 hours as needed for Pain With food 10 tablet 0    DHEA 25 MG CAPS Take by mouth      venlafaxine (EFFEXOR XR) 75 MG extended release capsule Take 75 mg by mouth daily       No current facility-administered medications for this visit. No Known Allergies  There is no problem list on file for this patient. Past Medical History:   Diagnosis Date    Migraine      Past Surgical History:   Procedure Laterality Date    ADENOIDECTOMY      BREAST SURGERY  2015    IMPLANT BREAST SILICONE/EQ      TONSILLECTOMY      TONSILLECTOMY AND ADENOIDECTOMY  1995     Family History   Problem Relation Age of Onset    Colon Cancer Neg Hx     Uterine Cancer Paternal Grandmother     Ovarian Cancer Neg Hx     Stroke Maternal Grandfather     Heart Disease Maternal Grandmother     Cancer Father         stage 4 lung cancer    Anxiety Disorder Father     Depression Father     Hypertension Father     Alcohol Abuse Father     Anxiety Disorder Mother     Depression Mother     Hypertension Mother     Breast Cancer Mother 58    Alcohol Abuse Paternal Grandfather     Heart Attack Paternal Grandfather     No Known Problems Brother     No Known Problems Brother      Social History     Tobacco Use    Smoking status: Never Smoker    Smokeless tobacco: Never Used   Substance Use Topics    Alcohol use: No         REVIEW OF SYSTEM    Review of Systems   Constitutional: Negative. Negative for activity change, appetite change, chills, diaphoresis, fatigue, fever and unexpected weight change. HENT: Negative. Negative for congestion, dental problem, drooling, ear discharge, ear pain, facial swelling, hearing loss, mouth sores, nosebleeds, postnasal drip, rhinorrhea, sinus pressure, sinus pain, sneezing, sore throat, tinnitus, trouble swallowing and voice change. Eyes: Negative. Negative for photophobia, pain, discharge, redness, itching and visual disturbance. Respiratory: Negative. Negative for apnea, cough, choking, chest tightness, shortness of breath, wheezing and stridor. Cardiovascular: Negative. Negative for chest pain, palpitations and leg swelling. Gastrointestinal: Negative. Negative for abdominal distention, abdominal pain, anal bleeding, blood in stool, constipation, diarrhea, nausea, rectal pain and vomiting. Endocrine: Negative. Negative for cold intolerance, heat intolerance, polydipsia, polyphagia and polyuria. Genitourinary: Negative. Negative for decreased urine volume, difficulty urinating, dysuria, enuresis, flank pain, frequency, genital sores, hematuria and urgency. Musculoskeletal: Negative. Negative for arthralgias, back pain, gait problem, joint swelling, myalgias, neck pain and neck stiffness. Skin: Positive for rash (Left upper inner thigh). Negative for color change, pallor and wound. Allergic/Immunologic: Negative. Negative for environmental allergies, food allergies and immunocompromised state. Neurological: Negative. Negative for dizziness, tremors, seizures, syncope, facial asymmetry, speech difficulty, weakness, light-headedness, numbness and headaches. Hematological: Negative. Negative for adenopathy. Does not bruise/bleed easily. Psychiatric/Behavioral: Negative. Negative for agitation, behavioral problems, confusion, decreased concentration, dysphoric mood, hallucinations, self-injury, sleep disturbance and suicidal ideas. The patient is not nervous/anxious and is not hyperactive. OBJECTIVE:  /72 (Site: Left Upper Arm, Position: Sitting)   Pulse 79   Wt 160 lb (72.6 kg)   SpO2 96%   BMI 25.06 kg/m²      Physical Exam  Constitutional:       General: She is not in acute distress. Appearance: Normal appearance. She is not ill-appearing, toxic-appearing or diaphoretic. HENT:      Head: Normocephalic and atraumatic. Cardiovascular:      Rate and Rhythm: Normal rate and regular rhythm. Pulses: Normal pulses. Heart sounds: Normal heart sounds. No murmur heard. No friction rub. No gallop. Pulmonary:      Effort: Pulmonary effort is normal. No respiratory distress.       Breath sounds: Normal breath sounds. No stridor. No wheezing, rhonchi or rales. Chest:      Chest wall: No tenderness. Skin:     General: Skin is warm and dry. Findings: Rash present. Neurological:      General: No focal deficit present. Mental Status: She is alert and oriented to person, place, and time. Psychiatric:         Mood and Affect: Mood normal.         Behavior: Behavior normal.         Thought Content: Thought content normal.         Judgment: Judgment normal.           Medical problems and test results were reviewed with the patient today. Lab Results   Component Value Date     03/28/2022    K 4.8 03/28/2022    CL 97 03/28/2022    CO2 23 03/28/2022    BUN 15 03/28/2022    CREATININE 0.80 03/28/2022    GLUCOSE 86 03/28/2022    CALCIUM 9.9 03/28/2022    PROT 7.2 03/28/2022    LABALBU 4.9 03/28/2022    BILITOT 0.5 03/28/2022    ALKPHOS 71 03/28/2022    AST 23 03/28/2022    ALT 16 03/28/2022    GFRAA 102 03/11/2021    AGRATIO 2.1 03/28/2022           ASSESSMENT and PLAN    1. Herpes zoster without complication  -     valACYclovir (VALTREX) 1 g tablet; Take 1 tablet by mouth 3 times daily for 10 days, Disp-30 tablet, R-0Normal  -     gabapentin (NEURONTIN) 300 MG capsule; Take 1 capsule by mouth 2 times daily as needed (pain) for up to 15 days. With food. May cause drowsiness/sleepiness, Disp-30 capsule, R-0Normal  -     ibuprofen (ADVIL;MOTRIN) 800 MG tablet; Take 1 tablet by mouth every 8 hours as needed for Pain With food, Disp-10 tablet, R-0Normal    Rash in appearance and symptom presentation are consistent with herpes zoster. Patient reports having increase in stress recently which is what likely initiated episode. Patient reports having pain and discomfort with rash, likely neuropathic pain from nerve irritation. We will have patient start with Valtrex 1 g 3 times daily for 10 days for antiviral treatment.   Advised patient of side effects and for patient to take medication with food and staying hydrated with use of medication. We will also have patient start ibuprofen 800 mg 3 times daily as needed for inflammation and pain. Reviewed risks and side effects of medication including increased GI upset, increased risks for cardiac disease/MI, and increased risk of nephrotoxicity. Patient was advised to take medication with food, stay hydrated, and to avoid oral OTC NSAID aside for Tylenol/acetaminophen such as ibuprofen, Motrin, naproxen, Aleve, Excedrin, BC or Goody powder. Start gabapentin as needed for neuropathic pain. Review risk and side effects of medication including drowsiness, dizziness, increased risk of constipation, increased risk of injury. Patient was advised not to take medication and drive or operate machinery due to increased risk of injury. Patient was also advised to exercise caution when getting up after taking medication to avoid falls. Also advised patient to keep rash/lesion covered until lesions are crusted to avoid contagion to close family members. Advised patient to let us know if symptoms do not improve or resolve. Elements of this note have been dictated using speech recognition software. As a result, errors of speech recognition may have occurred. On this date 6/1/2022 I have spent 30 minutes reviewing previous notes, test results and face to face with the patient discussing the diagnosis and importance of compliance with the treatment plan as well as documenting on the day of the visit. Greater than 50% of this visit was spent counseling the patient about test results, prognosis, importance of compliance, education about disease process, benefits of medications, instructions for management of acute symptoms, and follow up plans. Return if symptoms worsen or fail to improve.      Zabrina Weinberg, APRN - CNP

## 2022-09-17 NOTE — PROGRESS NOTES
ENDOCRINOLOGY PROGRESS NOTE  Yoselyn Cisneros  Date of birth 1963  Date of service 9/17/2022  Admission date 6/24/2022  [id number]    Reason for Follow-up:  Evaluation and management of Secondary Diabetes Mellitus from Nutritional supplement and steroids with hyperglycemia.    History of Present Illness (taken from previous endocrine note):  This is a 59 year old Female who is admitted for diarrhea. 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 Was started on Solumedrol IV BID ( up to 64 mg BID) on 7/1/2022 for possible GVHD. Was started on TPN with 75 grams dextrose on 7/13/2022. TPN stopped 7/20/22 due to rising t-bili. 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 TPN stopped 7/20/22.     Recent events/ROS:   Prednisone reduced to 55 mg daily starting from 09/13. Poor appetite. BG in the 50s twice on 9/16 after being normal on same insulin and prednisone doses on 9/15.    Current diet: Daily - Pm Snack; Ensure Clear Berry/clear Liquid Supplement, Berry Oral Nutrition Supplement  Transition Diet.      Hospital Medications: NPH 16 units q am with prednisone 55 mg, Humalog 2:50 > 150 TID AC. LT4 125mcg QD     When on TPN: Lantus 16 units at night; Regular Insulin 8 units q 6 hrs at 0000, 0600, 1200, 1800 +2:50>150. TPN x 18 hours from 2200 to 1500 (300 grams of dextrose and 25 units of regular insulin in bag), Levothyroxine 100 mcg.      Blood sugar review: Last 24 hours blood sugars ranged   Recent Labs   Lab 09/16/22  0943 09/16/22  1040 09/16/22  1352 09/16/22  1815 09/16/22  2106   GLUCOSE BEDSIDE 52* 74 95 57* 70     Home Regimen: No diabetes home regimen. Levothyroxine 100 mcg    HISTORY: Past medical, surgical, family and social history reviewed from initial endocrine note.    PHYSICAL EXAM:  Visit Vitals  /70 (BP Location: RUE - Right upper extremity)   Pulse 85   Temp 97.9 °F (36.6 °C) (Oral)   Resp 18   Ht 5' 2\" (1.575 m)   Wt 79.2 kg (174 lb 9.7 oz)  Sobia Luke  : 1970  Primary: [facility] [place]  Secondary:  [facility] Center at [address]  [phone]   [phone]        OUTPATIENT PHYSICAL THERAPY:Progress Report 2019   ICD-10: Treatment Diagnosis: Left shoulder pain (M25.512); Muscle weakness, generalized (M62.81)  Precautions/Allergies:   Patient has no known allergies. TREATMENT PLAN:  Effective Dates: 2019 TO 1/3/2020 (60 days). Frequency/Duration: 2 times a week for 60 Day(s) MEDICAL/REFERRING DIAGNOSIS:  Pain in left shoulder [M25.512]   DATE OF ONSET: 10/7/19  REFERRING PHYSICIAN: Helen Vaz MD MD Orders: Chuck Calle and Treat  Return MD Appointment: TBD     UPDATE (19): Elliott Fleming has attended 9 physical therapy visits and has made some improvements in subjective and objective complaints. She has less pain sleeping and with activities performed below shoulder height. She continues to struggle with active flexion and ER ROM making it difficult for her to wash/style her hair and perform other necessary ADL and functional activities. She is showing signs/symptoms consistent with adhesive capsulitis with significantly restricted glenohumeral joint mobility following a normal capsular pattern. She also exhibits weakness in her posterior rotator cuff not allowing for normal humeral centering during flexion/abduction movements. She will greatly benefit from continued skilled therapy to address mobility and strength deficits to allow her to return to a premorbid level of function. PROBLEM LIST (Impacting functional limitations):  1. Decreased Strength  2. Decreased ADL/Functional Activities  3. Increased Pain  4. Decreased Activity Tolerance  5. Decreased Flexibility/Joint Mobility  6. Edema/Girth  7. Decreased Knowledge of Precautions  8.  Decreased [place] with Home Exercise Program INTERVENTIONS PLANNED: (Treatment may consist of any combination   SpO2 100%   BMI 31.94 kg/m²   Constitutional: In bed. Tired appearing   ENT: closed  Respiratory: non-labored.  Skin: warm, dry, no visible rashes.  Neurologic: No resting tremors. Sleeping.    Lab Review:  Hemoglobin A1C (%)   Date Value   07/14/2022 6.0 (H)     Creatinine (mg/dL)   Date Value   09/17/2022 0.63     TSH (mcUnits/mL)   Date Value   09/10/2022 6.266 (H)     T4, Free (ng/dL)   Date Value   09/10/2022 0.6 (L)     HGB (g/dL)   Date Value   09/17/2022 9.6 (L)     Assessment/Plan:  Secondary Diabetes Mellitus from nutritional support (TPN stopped 7/20/22 due to rising t-bili, restarted 8/3-8/8) and steroids.     On prednisone 55 mg daily. Low BG yesterday x 2, NPH decreased for today and will consider discontinuation if able. Monitor on current regimen   - decrease NPH to 8 units daily with prednisone. Hold insulin if prednisone is held.   - continue Humalog medium dose correction TID AC    - Check BG tid ac,hs, and PRN   - Continue hypoglycemia protocol PRN   - Continue moderate CHO diet  Notify endocrine with steroid or diet changes    Hypothyroidism  -  levothyroxine increased to 125 mcg daily on 9/11   - Repeat TFTs in 4 weeks    We will continue to follow and titrate/manage medications as appropriate.     Case discussed and plan developed with attending endocrinologist, Dr. Neno Oconnor NP    I have seen and examined the patient and conducted my own physical exam below.  >50% of the patient care time, including face to face contact and coordination of care-was spent by myself as the primary provider.  100% of the Medical Decision Making for this patient was done by myself.  I have fully reviewed the History and Physical Exam and agree with the full note above by GILDARDO Oconnor.  GENERAL:  Well-nourished, well-hydrated adult in NAD.  SKIN:   Warm and dry without rash.  Normal texture and temperature.  NECK:   supple.  LUNGS:  Non-labored respirations.  .  CARDIOVASCULAR:  s1s2. Normal  of the following)  1. Cold  2. Home Exercise Program (HEP)  3. Manual Therapy  4. Neuromuscular Re-education/Strengthening  5. Range of Motion (ROM)  6. Therapeutic Activites  7. Therapeutic Exercise/Strengthening     GOALS: (Goals have been discussed and agreed upon with patient.)  Discharge Goals: Time Frame: 11/4/19 - 1/3/20  1. Patient will be independent with home exercise program without assistance from therapist. Katty Kent  2. Patient will report 15/55 Quick DASH score to demonstrate improved functional capacity. NO CHANGE  3. 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 SOME PROGRESSION  4. Patient will demonstrate L passive ER to 80 degrees to demonstrate improved functional mobility. SOME PROGRESSION  5. Patient will demonstrate 4+/5 L ER and scaption strength to allow her to return to return to light UE workouts in the gym. SOME PROGRESSION      OUTCOME MEASURE:   Tool Used: Disabilities of the Arm, Shoulder and Hand (DASH) Questionnaire - Quick Version  Score:  Initial: 27/55 (11/4/19)  Most Recent: 28/55 (Date: 12/5/19 )   Interpretation of Score: The DASH is designed to measure the activities of daily living in person's with upper extremity dysfunction or pain. Each section is scored on a 1-5 scale, 5 representing the greatest disability. The scores of each section are added together for a total score of 55. MEDICAL NECESSITY:   · Patient is expected to demonstrate progress in strength, range of motion, coordination and functional technique to increase independence with bathing, dressing, lifting and carrying. REASON FOR SERVICES/OTHER COMMENTS:  · Patient continues to require present interventions due to patient's inability to fully participate in all ADL and functional activities.   Total Duration:  PT Patient Time In/Time Out  Time In: 1525  Time Out: 36    Rehabilitation Potential For Stated Goals: Excellent  Regarding Peg Arthur therapy, I certify that the treatment plan above will be carried out by a therapist or under their direction. Thank you for this referral,  Brigid Lloyd     Referring Physician Signature: Alvin Land MD No Signature is Required for this note. PAIN/SUBJECTIVE:   Initial: Pain Intensity 1: 3/10 Post Session:  5/10   HISTORY:   History of Injury/Illness (Reason for Referral):  Kerry Lopez presents to physical therapy today with complaints of L shoulder pain secondary to arthroscopic surgery on 10/7/19. She was having shoulder issues since March with no particular SELENE. She had 2 injections and a round of PT to try and prevent surgery but she continued to have pain early in the morning and at night when trying to sleep. She underwent a subacromial decompression with no involvement of the labrum, rotator cuff or biceps tendon according to patient. She reports she was doing well until this past Friday she did light bicep curls and had her  massage her shoulder and it seemed to increase her pain in biceps region. No reports of numbness/tingling in L UE. She currently uses heat and motrin prn. She wants to return to working out with her  at the gym and hiking with her family. Past Medical History/Comorbidities:   Ms. Jane Lopez  has a past medical history of Migraine. She also has no past medical history of Adverse effect of anesthesia, Difficult intubation, or Nausea & vomiting. Ms. Jane Lopez  has a past surgical history that includes implant breast silicone/eq; hx tonsil and adenoidectomy (1995); hx breast augmentation (2015); hx tonsillectomy; and hx adenoidectomy.     Social History/Living Environment:     Lives with  and kids  Prior Level of Function/Work/Activity:  Fully functioning prior to March 2019  Dominant Side:         LEFT                    Current Medications:       Current Outpatient upper ext pulses.  ABDOMEN:   Soft and nontender.   EXTREMITIES: No edema, tenderness, clubbing or cyanosis. MORALES x 4  Fatigued, cushingoid.     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    Marek Lazcano MD           Medications:     LO LOESTRIN FE 1 mg-10 mcg (24)/10 mcg (2) tab, TAKE ONE TABLET BY MOUTH ONE TIME DAILY, Disp: 3 Package, Rfl: 3    SUMAtriptan (IMITREX) 100 mg tablet, TAKE 1 TABLET BY MOUTH ONCE AS NEEDED FOR MIGRAINE FOR UP TO 1 DOSE, Disp: 10 Tab, Rfl: 0    cyclobenzaprine (FLEXERIL) 10 mg tablet, Take 1 tablet as needed once a day for tension headache, Disp: 30 Tab, Rfl: 0    OTHER, Melaleuca Vitamin Sukhi, Disp: , Rfl:     pseudoephedrine (SUDAFED) 30 mg tablet, Take  by mouth every four (4) hours as needed for Congestion. , Disp: , Rfl:     multivitamin (ONE A DAY) tablet, Take 1 Tab by mouth daily. , Disp: , Rfl:     b complex vitamins tablet, Take 1 Tab by mouth daily. , Disp: , Rfl:     CHOLECALCIFEROL, VITAMIN D3, SL, by SubLINGual route., Disp: , Rfl:     prasterone, dhea, (DHEA) 25 mg cap, Take  by mouth., Disp: , Rfl:     Omega-3 Fatty Acids (FISH OIL) 300 mg cap, Take  by mouth., Disp: , Rfl:    Date Last Reviewed:  12/4/2019   Number of Personal Factors/Comorbidities that affect the Plan of Care: 0: LOW COMPLEXITY   EXAMINATION:   OBSERVATION/POSTURE: Rounded shoulders. Three portal holes are closed and showing good signs of healing.     PALPATION: TTP along L proximal biceps tendon, anterior deltoid, upper trap, posterior deltoid, infraspinatus, teres major/minor and lats    ROM: measured in degrees      RIGHT LEFT   Flexion 158/180 90 actively, 125 passively    Abduction 150 90 painful   External Rotation 80/107 50 passively painful   Internal Rotation 75 30     STRENGTH: ER 3+/5    SPECIAL TESTS: not assessed at this time     NEUROLOGICAL SCREEN: normal    FUNCTIONAL MOBILITY      RIGHT LEFT   Apley ER T3 Skull - painful   Apley IR T3 L buttock - painful   Horizontal adduction Posterior shoulder nt     FLEXIBILITY: decreased mobility of upper traps and pecs    JOINT MOBILITY: decreased glenohumeral joint mobility in posterior and inferior direction    HK (Lisa-Kentrell); ER (external rotation); IR (internal rotation); HA (horizontal adduction); HAB (horizontal abduction); GH (glenohumeral); AC (acromioclavicular); TTP (tenderness to palpation); UT (upper trapezius); * (painful); nt (not tested); WFL (within functional limits)     Body Structures Involved:  1. Nerves  2. Bones  3. Joints  4. Muscles  5. Ligaments Body Functions Affected:  1. Sensory/Pain  2. Neuromusculoskeletal  3. Movement Related Activities and Participation Affected:  1. General Tasks and Demands  2. Mobility  3. Interpersonal Interactions and Relationships  4.  Community, Social and [place]   Number of elements (examined above) that affect the Plan of Care: 1-2: LOW COMPLEXITY   CLINICAL PRESENTATION:   Presentation: Stable and uncomplicated: LOW COMPLEXITY   CLINICAL DECISION MAKING:   Use of outcome tool(s) and clinical judgement create a POC that gives a: Clear prediction of patient's progress: LOW COMPLEXITY

## 2022-10-17 RX ORDER — VENLAFAXINE HYDROCHLORIDE 75 MG/1
CAPSULE, EXTENDED RELEASE ORAL
Qty: 30 CAPSULE | Refills: 5 | Status: SHIPPED | OUTPATIENT
Start: 2022-10-17

## 2023-07-07 ENCOUNTER — OFFICE VISIT (OUTPATIENT)
Dept: ORTHOPEDIC SURGERY | Age: 53
End: 2023-07-07

## 2023-07-07 ENCOUNTER — HOSPITAL ENCOUNTER (OUTPATIENT)
Dept: GENERAL RADIOLOGY | Age: 53
Discharge: HOME | End: 2023-07-07
Payer: OTHER GOVERNMENT

## 2023-07-07 DIAGNOSIS — M25.551 RIGHT HIP PAIN: ICD-10-CM

## 2023-07-07 DIAGNOSIS — S73.191A TEAR OF RIGHT ACETABULAR LABRUM, INITIAL ENCOUNTER: Primary | ICD-10-CM

## 2023-07-07 PROCEDURE — 73502 X-RAY EXAM HIP UNI 2-3 VIEWS: CPT

## 2023-07-07 RX ORDER — METHYLPREDNISOLONE ACETATE 40 MG/ML
40 INJECTION, SUSPENSION INTRA-ARTICULAR; INTRALESIONAL; INTRAMUSCULAR; SOFT TISSUE ONCE
Status: COMPLETED | OUTPATIENT
Start: 2023-07-07 | End: 2023-07-07

## 2023-07-07 RX ADMIN — METHYLPREDNISOLONE ACETATE 40 MG: 40 INJECTION, SUSPENSION INTRA-ARTICULAR; INTRALESIONAL; INTRAMUSCULAR; SOFT TISSUE at 13:59

## 2023-07-07 NOTE — PROGRESS NOTES
Name: Deya Cevallos  YOB: 1970  Gender: female  MRN: [id number]  Date of Encounter:  7/7/2023       CHIEF COMPLAINT:     No chief complaint on file. SUBJECTIVE/OBJECTIVE:      HPI:    Deya Cevallos  is a 47 y.o. pleasant female who presents today for a new evaluation of her right hip pain. She comes in for atraumatic anterior right hip pain that she localizes to the groin. Pain radiates outside her hip down her leg to the inside of her right foot. She denies leg weakness or numbness. She denies back pain. She strength trains 4 days a week, she wallks 45 minutes every day. She has most of her pain when sitting. She has pain getting out of her car. She has done didn't soft tissue manipulation, chiropractic, kinesiologist without improvement. She has not had any PT. She takes advil without much relief. PAST HISTORY:   Past medical, surgical, family, social history and allergies reviewed by me. Pertinent history:   Tobacco use:  reports that she has never smoked. She has never used smokeless tobacco.  Diabetes: none  Anticoagulation: no      REVIEW OF SYSTEMS:   As noted in HPI. PHYSICAL EXAMINATION:     Gen: Well-developed, no acute distress   HEENT: NC/AT, EOMI   Neck: Trachea midline, normal ROM   CV: Regular rhythm by palpation of distal pulse, normal capillary refill   Pulm: No respiratory distress, no stridor   Psychiatric: Well oriented, normal mood and affect. Skin: No rashes, lesions or ulcers, normal temperature, turgor, and texture on uninvolved extremity. ORTHO EXAM:    Right Hip:      Inspection: No deformity, No edema, No effusion  ROM: 110 flexion, 20 internal rotation, 45 external rotation  Tenderness:  Mild anterior hip and flexor tenderness . Mild tenderness to greater trochanter. No pubic symphysis tenderness.    Strength: Hip flexion 4+/5, abduction 4+/5  Provocative tests: Log roll positive , Stinchfield positive, FADIR positive, ADELFO

## 2023-07-07 NOTE — PATIENT INSTRUCTIONS
Joint Injection Patient Instructions:     Medication(s) administered today during procedure:   Xylocaine 1% (lidocaine) and Depo-medrol 40mg/mL (methylprednisolone) or Kenalog 40mg/mL (Triamcinolone)    Medicines:   - Take Tylenol (acetaminophen) or either ibuprofen or Aleve, all of which are over-the-counter medicines for pain as directed (if not medically contraindicated - please ask your doctor if unsure)     Site Care:    - Remove band-aid from site after 6 hours    - For the first 24 to 48 hours, you may apply ice to site for 10 to 15 minutes, once or twice every hour as needed for comfort    - Be sure ice packs are not put directly on the skin. Wrap a light towel or cloth around the ice packs to protect the skin    - Keep clean with warm water and soap only     - Showers only. No bath, whirlpool, swimming pool or anything that would submerge the body part underwater for 48 hours (this will increase your risk for infection)     Activity:    - Do not overuse the affected limb     - Avoid strenuous activities involving the injected joint for 1 week to allow the procedure to be effective. What to Expect After the Injection:    - Some soreness and bruising at the injection site(s)   -  It is normal to experience numbness or heaviness around the area of the injection    - The area may feel worse before it gets better. If it feels worse within the first 24 hours or is bothersome then ice it for 15 minutes, alternating with 15 minutes off. You may also use over-the-counter anti-inflammatory medication (if not medically contraindicated- check with your doctor if you are unsure) (example Tylenol  (acetaminophen) or Aleve (naproxen) or Advil (Ibuprofen) as needed) and elevation to help with this discomfort.       Call the clinic at [phone] for:    - Any unusual increase in your level of pain    - Worsening of swelling or redness of the affected joint or area injected    - Drainage from the injection site

## 2023-08-04 ENCOUNTER — HOSPITAL ENCOUNTER (OUTPATIENT)
Dept: PHYSICAL THERAPY | Age: 53
Setting detail: RECURRING SERIES
Discharge: HOME | End: 2023-08-07
Attending: STUDENT IN AN ORGANIZED HEALTH CARE EDUCATION/TRAINING PROGRAM
Payer: OTHER GOVERNMENT

## 2023-08-04 PROCEDURE — 97110 THERAPEUTIC EXERCISES: CPT

## 2023-08-04 PROCEDURE — 97162 PT EVAL MOD COMPLEX 30 MIN: CPT

## 2023-08-04 ASSESSMENT — PAIN SCALES - GENERAL: PAINLEVEL_OUTOF10: 3

## 2023-08-04 NOTE — THERAPY EVALUATION
time.  Pt will report no limitation with working out or squatting due to pain. Pt will demonstrate improvement in function with LEFS to 60/80 or greater. Outcome Measure: Tool Used: Lower Extremity Functional Scale (LEFS)  Score:  Initial: 40/80 Most Recent: X/80 (Date: -- )   Interpretation of Score: 20 questions each scored on a 5 point scale with 0 representing \"extreme difficulty or unable to perform\" and 4 representing \"no difficulty\". The lower the score, the greater the functional disability. 80/80 represents no disability. Minimal detectable change is 9 points. Medical Necessity:   > Skilled intervention continues to be required due to pain with sitting and standing. Reason For Services/Other Comments:  > Patient continues to require skilled intervention due to pain with sitting or standing. Total Duration:  Time In: 0900  Time Out: 1000    Regarding Beverly Kapilb therapy, I certify that the treatment plan above will be carried out by a therapist or under their direction.   Thank you for this referral,  Deni Lundy PT     Referring Physician Signature: Stacey Elizabeth MD                    Post Session Pain  Charge Capture  PT Visit Info MD Guidelines  MyChart

## 2023-08-10 ENCOUNTER — HOSPITAL ENCOUNTER (OUTPATIENT)
Dept: PHYSICAL THERAPY | Age: 53
Setting detail: RECURRING SERIES
Discharge: HOME | End: 2023-08-13
Attending: STUDENT IN AN ORGANIZED HEALTH CARE EDUCATION/TRAINING PROGRAM
Payer: OTHER GOVERNMENT

## 2023-08-10 PROCEDURE — 97140 MANUAL THERAPY 1/> REGIONS: CPT

## 2023-08-10 PROCEDURE — 97110 THERAPEUTIC EXERCISES: CPT

## 2023-08-10 NOTE — PROGRESS NOTES
Cleopatra Rangel  : 1970  Primary: Mojgan Puckett (Other)  Secondary:  [address] @ [address] [facility]  [address]  [address] [facility]  Phone: [phone]  Fax: [phone] Plan Frequency: 2x/week    Plan of Care/Certification Expiration Date: 23      >PT Visit Info:  Plan Frequency: 2x/week  Plan of Care/Certification Expiration Date: 23      Visit Count:  2    OUTPATIENT PHYSICAL THERAPY:OP NOTE TYPE: OP Note Type: Treatment Note 8/10/2023       Episode  }Appt Desk           ICD-10: Treatment Diagnosis: Sciatica, right side (M54.31)  Pain in right hip (M25.551)  Pain in right thigh (M79.651)   Medical/Referring Diagnosis:  Right hip pain [M25.551]  Tear of right acetabular labrum, initial encounter [G48.402Z]  Referring Physician:  Stacey Elizabeth MD MD Orders:  PT Eval and Treat   Date of Onset:  No data recorded 2023   Allergies:   Patient has no known allergies. Restrictions/Precautions:  Restrictions/Precautions: None  No data recorded   Interventions Planned (Treatment may consist of any combination of the following):    Current Treatment Recommendations: Strengthening; ROM; Manual; Neuromuscular re-education; Pain management; Home exercise program; Dry needling; Therapeutic activities     >Subjective Comments:   Pt reports that she is doing well with her HEP.    >Initial:     2 /10>Post Session:       2 /10  Medications Last Reviewed:  8/10/2023  Updated Objective Findings:  None Today  Treatment   Manual Therapy (15  Minutes): Manual techniques to facilitate improved motion and decreased pain. (Used abbreviations: MET - muscle energy technique; PNF - proprioceptive neuromuscular facilitation; NMR - neuromuscular re-education; a/p - anterior to posterior; p/a - posterior to anterior)   Long axis traction   STM to hip flexors/quad     THERAPEUTIC EXERCISE: (40 minutes):    Exercises per grid below to improve mobility and strength.   Required minimal verbal

## 2023-08-11 ENCOUNTER — APPOINTMENT (OUTPATIENT)
Dept: PHYSICAL THERAPY | Age: 53
End: 2023-08-11
Attending: STUDENT IN AN ORGANIZED HEALTH CARE EDUCATION/TRAINING PROGRAM
Payer: OTHER GOVERNMENT

## 2023-08-14 ENCOUNTER — APPOINTMENT (OUTPATIENT)
Dept: PHYSICAL THERAPY | Age: 53
End: 2023-08-14
Attending: STUDENT IN AN ORGANIZED HEALTH CARE EDUCATION/TRAINING PROGRAM
Payer: OTHER GOVERNMENT

## 2023-08-17 ENCOUNTER — HOSPITAL ENCOUNTER (OUTPATIENT)
Dept: PHYSICAL THERAPY | Age: 53
Setting detail: RECURRING SERIES
End: 2023-08-17
Attending: STUDENT IN AN ORGANIZED HEALTH CARE EDUCATION/TRAINING PROGRAM
Payer: OTHER GOVERNMENT

## 2023-08-17 NOTE — PROGRESS NOTES
Rae Perez  : 1970  Primary: Facundo Martini  Secondary:  [address]  [phone]   [phone]      DATE: 2023    Patient canceled for appointment today due to unknown reason. Will plan to follow up on next scheduled visit.       Rosy Coburn, PT, DPT

## 2023-08-21 ENCOUNTER — HOSPITAL ENCOUNTER (OUTPATIENT)
Dept: PHYSICAL THERAPY | Age: 53
Setting detail: RECURRING SERIES
Discharge: HOME | End: 2023-08-24
Attending: STUDENT IN AN ORGANIZED HEALTH CARE EDUCATION/TRAINING PROGRAM
Payer: OTHER GOVERNMENT

## 2023-08-21 PROCEDURE — 97140 MANUAL THERAPY 1/> REGIONS: CPT

## 2023-08-21 PROCEDURE — 97110 THERAPEUTIC EXERCISES: CPT

## 2023-08-21 NOTE — PROGRESS NOTES
gluteals/TFL    THERAPEUTIC EXERCISE: (40 minutes):    Exercises per grid below to improve mobility and strength. Required minimal verbal cues to promote proper body mechanics. Progressed resistance, range, repetitions, and complexity of movement as indicated. 2023   Activity/Exercise Parameters                 Patient education    Cat camel  10 reps    Sarahann Chintan pose  5 reps    Sciatic nerve glide  Supine 10 each side    Bridge  With marching: 3 x 10    Clamshells  3 x 10    Bird dog 3 x 10    Hip hinge  With and without stick for cuing, 30 reps with black band for resistance    Half kneeling to tall kneeling  2 x 10    Half kneeling PNF Rhythmic stabilization in half kneelinx each way      Treatment/Session Summary:    >Treatment Assessment:   Pt with more instability when stabilizing on her affected extremity during exercises. Communication/Consultation:  None today  Equipment provided today:  HEP  Recommendations/Intent for next treatment session: Next visit will focus on progression of hip mobility and strengthening.     >Total Treatment Billable Duration:  55 minutes  Time In: 0800  Time Out: 0900    Emmie Lopez, PT       Charge Capture  }Post Session Pain  PT Visit Info  [facility] Portal  MD Guidelines  Scanned Media  Benefits  MyChart    Future Appointments   Date Time Provider [address]   2023  9:00 AM Elmo Gilford Thyra Hall, [facility]   2023  8:00 AM Elmo Gilford Thyra Hall, PT [facility]   2023  3:00 PM Will Mccord MD [facility]B   2023  8:00 AM Emmie Lopez, PT [facility]

## 2023-08-23 ENCOUNTER — HOSPITAL ENCOUNTER (OUTPATIENT)
Dept: PHYSICAL THERAPY | Age: 53
Setting detail: RECURRING SERIES
End: 2023-08-23
Attending: STUDENT IN AN ORGANIZED HEALTH CARE EDUCATION/TRAINING PROGRAM
Payer: OTHER GOVERNMENT

## 2023-08-23 NOTE — PROGRESS NOTES
Laurent Marie  : 1970  Primary: Jazz Harvey  Secondary:  [address] at [address]  [phone]   [phone]      DATE: 2023    Patient canceled for appointment today due to illness. Will plan to follow up on next scheduled visit.       Tashia Birch, PT, DPT

## 2023-08-29 ENCOUNTER — HOSPITAL ENCOUNTER (OUTPATIENT)
Dept: PHYSICAL THERAPY | Age: 53
Setting detail: RECURRING SERIES
Discharge: HOME | End: 2023-09-01
Attending: STUDENT IN AN ORGANIZED HEALTH CARE EDUCATION/TRAINING PROGRAM
Payer: OTHER GOVERNMENT

## 2023-08-29 PROCEDURE — 97110 THERAPEUTIC EXERCISES: CPT

## 2023-08-29 PROCEDURE — 97140 MANUAL THERAPY 1/> REGIONS: CPT

## 2023-08-29 NOTE — PROGRESS NOTES
Isabel Casey  : 1970  Primary: Val Alvarado (Other)  Secondary:  [address] [facility]  [address]  [address] [facility]  Phone: [phone]  Fax: [phone] Plan Frequency: 2x/week    Plan of Care/Certification Expiration Date: 23      >PT Visit Info:  Plan Frequency: 2x/week  Plan of Care/Certification Expiration Date: 23      Visit Count:  4    OUTPATIENT PHYSICAL THERAPY:OP NOTE TYPE: OP Note Type: Treatment Note 2023       Episode  }Appt Desk           ICD-10: Treatment Diagnosis: Sciatica, right side (M54.31)  Pain in right hip (M25.551)  Pain in right thigh (M79.651)   Medical/Referring Diagnosis:  Right hip pain [M25.551]  Tear of right acetabular labrum, initial encounter [A90.227A]  Referring Physician:  Adriana Osorio MD MD Orders:  PT Eval and Treat   Date of Onset:  No data recorded 2023   Allergies:   Patient has no known allergies. Restrictions/Precautions:  Restrictions/Precautions: None  No data recorded   Interventions Planned (Treatment may consist of any combination of the following):    Current Treatment Recommendations: Strengthening; ROM; Manual; Neuromuscular re-education; Pain management; Home exercise program; Dry needling; Therapeutic activities     >Subjective Comments:   Pt reports that she has good days/bad days. She reports that she feels the best when she does her exercises and the worst with wearing high heels. >Initial:     0 /10>Post Session:       0 /10  Medications Last Reviewed:  2023  Updated Objective Findings:  None Today  Treatment   Manual Therapy (15  Minutes): Manual techniques to facilitate improved motion and decreased pain.  (Used abbreviations: MET - muscle energy technique; PNF - proprioceptive neuromuscular facilitation; NMR - neuromuscular re-education; a/p - anterior to posterior; p/a - posterior to anterior)   Long axis traction   Lateral distraction with belt   STM to hip

## 2023-08-30 NOTE — PROGRESS NOTES
Name: René Arias  YOB: 1970  Gender: female  MRN: [id number]  Date of Encounter:  8/31/2023       CHIEF COMPLAINT:     Chief Complaint   Patient presents with    Follow-up     Right Hip        SUBJECTIVE/OBJECTIVE:      HPI:    Patient is a 47 y.o. pleasant female who presents today for a return evaluation of her right hip. Working diagnosis:   1. Lumbosacral radiculopathy at L4       LOV: 7/7/2023     Recall history: She has had ongoing atraumatic anterior right hip pain in the groin, as well as pain from the outside of her hip down her leg to the medial ankle. She has not noticed any numbness. 7/7/2023: Performed right hip CSI and referred to PT    She did get around 70% of pain relief with the hip injection but had increasing pain after intercourse. She gets pain relief from therapy during treatments but pain returns. She continues to note the pain going from the lateral hip down to the leg and groin pain. The radiating pain especially occurs if she wears heels. PAST HISTORY:   Past medical, surgical, family, social history and allergies reviewed by me. Unchanged from prior visit. REVIEW OF SYSTEMS:   As noted in HPI. PHYSICAL EXAMINATION:     Gen: Well-developed, no acute distress   HEENT: NC/AT, EOMI   Neck: Trachea midline, normal ROM   CV: Regular rhythm by palpation of distal pulse, normal capillary refill   Pulm: No respiratory distress, no stridor   Psychiatric: Well oriented, normal mood and affect. Skin: No rashes, lesions or ulcers, normal temperature, turgor, and texture on uninvolved extremity. ORTHO EXAM:    Right Hip:       Inspection: No deformity, No edema, No effusion  ROM: 110 flexion, 20 internal rotation, 45 external rotation  Tenderness:  Mild anterior hip and flexor tenderness . Mild tenderness to greater trochanter. No pubic symphysis tenderness.    Strength: Hip flexion 4+/5, abduction 4+/5  Provocative tests: Negative logroll, FADIR,

## 2023-08-31 ENCOUNTER — OFFICE VISIT (OUTPATIENT)
Dept: ORTHOPEDIC SURGERY | Age: 53
End: 2023-08-31
Payer: OTHER GOVERNMENT

## 2023-08-31 DIAGNOSIS — M54.17 LUMBOSACRAL RADICULOPATHY AT L4: Primary | ICD-10-CM

## 2023-08-31 PROCEDURE — 99214 OFFICE O/P EST MOD 30 MIN: CPT | Performed by: STUDENT IN AN ORGANIZED HEALTH CARE EDUCATION/TRAINING PROGRAM

## 2023-09-01 ENCOUNTER — HOSPITAL ENCOUNTER (OUTPATIENT)
Dept: PHYSICAL THERAPY | Age: 53
Setting detail: RECURRING SERIES
Discharge: HOME | End: 2023-09-04
Attending: STUDENT IN AN ORGANIZED HEALTH CARE EDUCATION/TRAINING PROGRAM
Payer: OTHER GOVERNMENT

## 2023-09-01 PROCEDURE — 97140 MANUAL THERAPY 1/> REGIONS: CPT

## 2023-09-01 PROCEDURE — 97110 THERAPEUTIC EXERCISES: CPT

## 2023-09-01 NOTE — PROGRESS NOTES
Meghan Braun  : 1970  Primary: Rebecca Enciso (Other)  Secondary:  [address] [facility]  [address]  [address] [facility]  Phone: [phone]  Fax: [phone] Plan Frequency: 2x/week    Plan of Care/Certification Expiration Date: 23      >PT Visit Info:  Plan Frequency: 2x/week  Plan of Care/Certification Expiration Date: 23      Visit Count:  5    OUTPATIENT PHYSICAL THERAPY:OP NOTE TYPE: OP Note Type: Treatment Note 2023       Episode  }Appt Desk           ICD-10: Treatment Diagnosis: Sciatica, right side (M54.31)  Pain in right hip (M25.551)  Pain in right thigh (M79.651)   Medical/Referring Diagnosis:  Right hip pain [M25.551]  Tear of right acetabular labrum, initial encounter [O04.658P]  Referring Physician:  Ya Samayoa MD MD Orders:  PT Eval and Treat   Date of Onset:  No data recorded 2023   Allergies:   Patient has no known allergies. Restrictions/Precautions:  Restrictions/Precautions: None  No data recorded   Interventions Planned (Treatment may consist of any combination of the following):    Current Treatment Recommendations: Strengthening; ROM; Manual; Neuromuscular re-education; Pain management; Home exercise program; Dry needling; Therapeutic activities     >Subjective Comments:   Pt reports that she still has the most pain with an externally rotated/abducted and flexed position with her hip. She reports most discomfort in her lower leg with standing in heels. >Initial:     0 /10>Post Session:       0 /10  Medications Last Reviewed:  2023  Updated Objective Findings:  None Today  Treatment   Manual Therapy (15  Minutes): Manual techniques to facilitate improved motion and decreased pain.  (Used abbreviations: MET - muscle energy technique; PNF - proprioceptive neuromuscular facilitation; NMR - neuromuscular re-education; a/p - anterior to posterior; p/a - posterior to anterior)   Long axis traction   Lateral distraction

## 2023-09-21 ENCOUNTER — TELEPHONE (OUTPATIENT)
Dept: ORTHOPEDIC SURGERY | Age: 53
End: 2023-09-21

## 2023-09-21 NOTE — TELEPHONE ENCOUNTER
Called patient to speak about MRI. She has fairly good study. She has mild listhesis and spondylosis with small disc bulging but minimal foraminal narrowing. I had reached out to Ann Hopson about getting the patient in for an evaluation if she is interested. I would recommend a check if she is continuing to have the radiating leg pain. Ann Hopson stated she is willing to see the patient.      Stacey Elizabeth MD

## 2023-09-26 ENCOUNTER — TELEPHONE (OUTPATIENT)
Dept: ORTHOPEDIC SURGERY | Age: 53
End: 2023-09-26

## 2023-10-02 ENCOUNTER — TELEPHONE (OUTPATIENT)
Dept: ORTHOPEDIC SURGERY | Age: 53
End: 2023-10-02

## 2023-10-02 DIAGNOSIS — S73.191A TEAR OF RIGHT ACETABULAR LABRUM, INITIAL ENCOUNTER: ICD-10-CM

## 2023-10-02 DIAGNOSIS — M25.551 RIGHT HIP PAIN: Primary | ICD-10-CM

## 2023-10-09 NOTE — TELEPHONE ENCOUNTER
I spoke with Elza directly. We had gone back and forth with phone calls for a couple of weeks and she had missed her follow up last week. We discussed the back MRI. It was a fairly unremarkable study, with some mild disc bulging and listhesis. Nothing with that study clearly explains her continued hip pain, which is still present, constant, painful at night. She had previously had temporary relief with an intraarticular injection. She has failed PT and oral medications. I have recommended MRI of the hip at this time, to evaluate for labral pathology. We will have her obtain this study and follow up to discuss results.      Destiny Freitas MD

## 2023-11-17 ENCOUNTER — TELEPHONE (OUTPATIENT)
Dept: ORTHOPEDIC SURGERY | Age: 53
End: 2023-11-17

## 2023-11-17 NOTE — TELEPHONE ENCOUNTER
Called pt. To discuss MRI results as she did  not schedule a follow up. No answer. Left VM that we would try her again Monday.    Avril Mohr MD

## 2024-04-01 ENCOUNTER — TRANSCRIBE ORDERS (OUTPATIENT)
Dept: SCHEDULING | Age: 54
End: 2024-04-01

## 2024-04-01 DIAGNOSIS — Z12.31 SCREENING MAMMOGRAM FOR HIGH-RISK PATIENT: Primary | ICD-10-CM

## 2024-05-06 ENCOUNTER — HOSPITAL ENCOUNTER (OUTPATIENT)
Dept: MAMMOGRAPHY | Age: 54
Discharge: HOME | End: 2024-05-09
Attending: FAMILY MEDICINE
Payer: OTHER GOVERNMENT

## 2024-05-06 VITALS — HEIGHT: 67 IN | WEIGHT: 160 LBS | BODY MASS INDEX: 25.11 KG/M2

## 2024-05-06 DIAGNOSIS — Z12.31 SCREENING MAMMOGRAM FOR HIGH-RISK PATIENT: ICD-10-CM

## 2024-05-06 PROCEDURE — 77067 SCR MAMMO BI INCL CAD: CPT

## (undated) DEVICE — SINGLE PORT MANIFOLD: Brand: NEPTUNE 2

## (undated) DEVICE — (D)STRIP SKN CLSR 0.5X4IN WHT --

## (undated) DEVICE — RESTRAINT UNIVERSAL HEAD DISP --

## (undated) DEVICE — (D)PREP SKN CHLRAPRP APPL 26ML -- CONVERT TO ITEM 371833

## (undated) DEVICE — GARMENT,MEDLINE,DVT,INT,CALF,MED, GEN2: Brand: MEDLINE

## (undated) DEVICE — SLING ARM AD ULT

## (undated) DEVICE — 90-S ACCELERATOR, SUCTION PROBE, NON-BENDABLE, MAX CUT LEVEL 11: Brand: SERFAS ENERGY

## (undated) DEVICE — DRAPE,SHOULDER,BEACH CHAIR,STERILE: Brand: MEDLINE

## (undated) DEVICE — SYR 50ML LR LCK 1ML GRAD NSAF --

## (undated) DEVICE — STOCKINETTE,IMPERVIOUS,12X48,STERILE: Brand: MEDLINE

## (undated) DEVICE — SET IRRIG W 96IN TBNG 4 LN FLX BG

## (undated) DEVICE — BUTTON SWITCH PENCIL BLADE ELECTRODE, HOLSTER: Brand: EDGE

## (undated) DEVICE — [AGGRESSIVE 6-FLUTE BARREL BUR, ARTHROSCOPIC SHAVER BLADE,  DO NOT RESTERILIZE,  DO NOT USE IF PACKAGE IS DAMAGED,  KEEP DRY,  KEEP AWAY FROM SUNLIGHT]: Brand: FORMULA

## (undated) DEVICE — SOFT SILICONE HYDROCELLULAR FOAM DRESSING WITH LOCK AWAY LAYER: Brand: ALLEVYN LIFE XL 21X21 CTN10

## (undated) DEVICE — NDL SPNE QNCKE 18GX3.5IN LF --

## (undated) DEVICE — SUTURE MCRYL SZ 3-0 L27IN ABSRB UD L19MM PS-2 3/8 CIR PRIM Y427H

## (undated) DEVICE — NEEDLE HYPO 18GA L1.5IN PNK S STL HUB POLYPR SHLD REG BVL

## (undated) DEVICE — [RESECTOR CUTTER, ARTHROSCOPIC SHAVER BLADE,  DO NOT RESTERILIZE,  DO NOT USE IF PACKAGE IS DAMAGED,  KEEP DRY,  KEEP AWAY FROM SUNLIGHT]: Brand: FORMULA

## (undated) DEVICE — SURGICAL PROCEDURE PACK BASIC ST FRANCIS

## (undated) DEVICE — MASTISOL ADHESIVE LIQ 2/3ML

## (undated) DEVICE — 3M™ COBAN™ SELF-ADHERENT WRAP, 1586S, STERILE, 6 IN X 5 YD (15 CM X 4,5 M), 12 ROLLS/CASE: Brand: 3M™ COBAN™

## (undated) DEVICE — SOLUTION IRRIG 3000ML 0.9% SOD CHL FLX CONT 0797208] ICU MEDICAL INC]

## (undated) DEVICE — REM POLYHESIVE ADULT PATIENT RETURN ELECTRODE: Brand: VALLEYLAB

## (undated) DEVICE — INTENDED FOR TISSUE SEPARATION, AND OTHER PROCEDURES THAT REQUIRE A SHARP SURGICAL BLADE TO PUNCTURE OR CUT.: Brand: BARD-PARKER ® STAINLESS STEEL BLADES

## (undated) DEVICE — SET IRRIG DST FLX M CONN